# Patient Record
Sex: FEMALE | Race: WHITE | NOT HISPANIC OR LATINO | Employment: OTHER | URBAN - METROPOLITAN AREA
[De-identification: names, ages, dates, MRNs, and addresses within clinical notes are randomized per-mention and may not be internally consistent; named-entity substitution may affect disease eponyms.]

---

## 2017-01-11 DIAGNOSIS — M25.561 RIGHT KNEE PAIN: Primary | ICD-10-CM

## 2017-01-12 ENCOUNTER — OFFICE VISIT (OUTPATIENT)
Dept: ORTHOPEDICS | Facility: CLINIC | Age: 72
End: 2017-01-12

## 2017-01-12 VITALS — HEIGHT: 67 IN | WEIGHT: 153 LBS | BODY MASS INDEX: 24.01 KG/M2

## 2017-01-12 DIAGNOSIS — Z96.641 HISTORY OF TOTAL REPLACEMENT OF RIGHT HIP: Primary | ICD-10-CM

## 2017-01-12 DIAGNOSIS — M17.11 PRIMARY OSTEOARTHRITIS OF RIGHT KNEE: Primary | ICD-10-CM

## 2017-01-12 ASSESSMENT — HOOS S4: HOW SEVERE IS YOUR HIP JOINT STIFFNESS AFTER FIRST WAKENING IN THE MORNING?: MILD

## 2017-01-12 ASSESSMENT — ACTIVITIES OF DAILY LIVING (ADL)
ADL_SUBSCALE_SCORE: 100
ADL_SUM: 0
ADL_MEAN: 0

## 2017-01-12 NOTE — Clinical Note
1/12/2017       RE: Leah Peña  [address]    [facility] [place] [address]     Dear Colleague,    Thank you for referring your patient, Leah Peña, to the [facility] at [facility]. Please see a copy of my visit note below.    Chief Complaint: Surgical Followup and RECHECK  Right knee OA  Bilateral TAJ    HPI: Leah Peña returns today in follow-up for medial knee pain. She reports that this is most evident when walking for distance. She reports that she takes tylenol qhs and in the AM daily. She uses nordic walking sticks for walking all distances. She reports that she is doing on vacation soon and wonders if she should get a steroid injection. She reports that the hips are pain free and she is happy about this.     Medications and allergies are documented in the EMR and have been reviewed.      Current outpatient prescriptions:      amLODIPine (NORVASC) 2.5 MG tablet, Take 1 tablet (2.5 mg) by mouth daily, Disp: 90 tablet, Rfl: 3     losartan (COZAAR) 100 MG tablet, Take 1 tablet (100 mg) by mouth daily, Disp: 90 tablet, Rfl: 3     labetalol (NORMODYNE) 200 MG tablet, Take 1 tablet (200 mg) by mouth 2 times daily, Disp: 180 tablet, Rfl: 3     acetaminophen (TYLENOL) 325 MG tablet, Take 2 tablets (650 mg) by mouth every 6 hours as needed for mild pain, Disp: 100 tablet, Rfl: 0     GLUCOSAMINE-CHONDROITIN DS PO, Take by mouth daily, Disp: , Rfl:      VITAMIN E, , Disp: , Rfl:      amoxicillin (AMOXIL) 500 MG tablet, Take 4 tabs by mouth 1 hour prior to dental procedure., Disp: 4 tablet, Rfl: 4     CALCIUM 1500 MG OR TABS, 1 po daily, Disp: , Rfl:      MULTI-DAY VITAMINS OR TABS, 1 TABLET DAILY, Disp: , Rfl:      GARLIC 1000 MG OR CAPS, None Entered, Disp: , Rfl:     Allergies: Hctz and Vasotec    Physical Exam:  On physical examination the patient appears the stated age, is in no acute distress, affect is appropriate, and breathing is  "non-labored.    Ht 1.702 m (5' 7\")  Wt 69.4 kg (153 lb)  BMI 23.96 kg/m2  Body mass index is 23.96 kg/(m^2).    Gait: normal  ROM:  Not irritated.  The knee is benign in appearance without redness or swelling.   Flexion: 130 and extension is to 0  TTP at the medial joint.     Distally, the circulatory, motor, and sensation exam is intact with 5/5 EHL, gastroc-soleus, and tibialis anterior.  Sensation to light touch is intact.  Dorsalis pedis and posterior tibialis pulses are palpable.  There are no sores on the feet, no bruising, and no lymphedema.    X-rays:    I reviewed the x-rays dated today.  Previous films reviewed.    Findings:  Normal progression for a total hip arthroplasty without evidence of loosening or subsidence.  The right knee has early to moderate OA.     Assessment: right knee early to moderat OA    Plan: She is not sure if she wants to do a steroid injection and is going to consider this. If she decides to do this it can be booked as a short appointment and just come in for an injection.     Raul Hobson    Twenty minutes were spent with the patient with greater than 50% on counseling and coordination of care.         Again, thank you for allowing me to participate in the care of your patient.      Sincerely,    Nathaniel Epperson MD      "

## 2017-01-12 NOTE — PROGRESS NOTES
"Chief Complaint: Surgical Followup and RECHECK  Right knee OA  Bilateral TAJ    HPI: Laeh Peña returns today in follow-up for medial knee pain. She reports that this is most evident when walking for distance. She reports that she takes tylenol qhs and in the AM daily. She uses nordic walking sticks for walking all distances. She reports that she is doing on vacation soon and wonders if she should get a steroid injection. She reports that the hips are pain free and she is happy about this.     Medications and allergies are documented in the EMR and have been reviewed.      Current outpatient prescriptions:      amLODIPine (NORVASC) 2.5 MG tablet, Take 1 tablet (2.5 mg) by mouth daily, Disp: 90 tablet, Rfl: 3     losartan (COZAAR) 100 MG tablet, Take 1 tablet (100 mg) by mouth daily, Disp: 90 tablet, Rfl: 3     labetalol (NORMODYNE) 200 MG tablet, Take 1 tablet (200 mg) by mouth 2 times daily, Disp: 180 tablet, Rfl: 3     acetaminophen (TYLENOL) 325 MG tablet, Take 2 tablets (650 mg) by mouth every 6 hours as needed for mild pain, Disp: 100 tablet, Rfl: 0     GLUCOSAMINE-CHONDROITIN DS PO, Take by mouth daily, Disp: , Rfl:      VITAMIN E, , Disp: , Rfl:      amoxicillin (AMOXIL) 500 MG tablet, Take 4 tabs by mouth 1 hour prior to dental procedure., Disp: 4 tablet, Rfl: 4     CALCIUM 1500 MG OR TABS, 1 po daily, Disp: , Rfl:      MULTI-DAY VITAMINS OR TABS, 1 TABLET DAILY, Disp: , Rfl:      GARLIC 1000 MG OR CAPS, None Entered, Disp: , Rfl:     Allergies: Hctz and Vasotec    Physical Exam:  On physical examination the patient appears the stated age, is in no acute distress, affect is appropriate, and breathing is non-labored.    Ht 1.702 m (5' 7\")  Wt 69.4 kg (153 lb)  BMI 23.96 kg/m2  Body mass index is 23.96 kg/(m^2).    Gait: normal  ROM:  Not irritated.  The knee is benign in appearance without redness or swelling.   Flexion: 130 and extension is to 0  TTP at the medial joint.     Distally, the circulatory, " motor, and sensation exam is intact with 5/5 EHL, gastroc-soleus, and tibialis anterior.  Sensation to light touch is intact.  Dorsalis pedis and posterior tibialis pulses are palpable.  There are no sores on the feet, no bruising, and no lymphedema.    X-rays:    I reviewed the x-rays dated today.  Previous films reviewed.    Findings:  Normal progression for a total hip arthroplasty without evidence of loosening or subsidence.  The right knee has early to moderate OA.     Assessment: right knee early to moderat OA    Plan: She is not sure if she wants to do a steroid injection and is going to consider this. If she decides to do this it can be booked as a short appointment and just come in for an injection.     Raul Hobson    Twenty minutes were spent with the patient with greater than 50% on counseling and coordination of care.

## 2017-01-12 NOTE — NURSING NOTE
"Reason For Visit:   Chief Complaint   Patient presents with     Surgical Followup     s/p right TAJ DOS 10/27/2014     RECHECK     Pt. states that she is here today fo Right Knee Pain, she mention that when she is walking is the worst. She does hear clicking noise.        Pain Assessment  Patient Currently in Pain: No               HEIGHT: 5' 7\", WEIGHT: 153 lbs 0 oz, BMI: Body mass index is 23.96 kg/(m^2).      Current Outpatient Prescriptions   Medication Sig Dispense Refill     amLODIPine (NORVASC) 2.5 MG tablet Take 1 tablet (2.5 mg) by mouth daily 90 tablet 3     losartan (COZAAR) 100 MG tablet Take 1 tablet (100 mg) by mouth daily 90 tablet 3     labetalol (NORMODYNE) 200 MG tablet Take 1 tablet (200 mg) by mouth 2 times daily 180 tablet 3     acetaminophen (TYLENOL) 325 MG tablet Take 2 tablets (650 mg) by mouth every 6 hours as needed for mild pain 100 tablet 0     GLUCOSAMINE-CHONDROITIN DS PO Take by mouth daily       VITAMIN E        amoxicillin (AMOXIL) 500 MG tablet Take 4 tabs by mouth 1 hour prior to dental procedure. 4 tablet 4     CALCIUM 1500 MG OR TABS 1 po daily       MULTI-DAY VITAMINS OR TABS 1 TABLET DAILY       GARLIC 1000 MG OR CAPS None Entered            Allergies   Allergen Reactions     Hctz Dermatitis     Vasotec Cough       "

## 2017-01-29 ENCOUNTER — MYC MEDICAL ADVICE (OUTPATIENT)
Dept: FAMILY MEDICINE | Facility: CLINIC | Age: 72
End: 2017-01-29

## 2017-01-29 DIAGNOSIS — Z29.89 ALTITUDE SICKNESS PROPHYLAXIS: Primary | ICD-10-CM

## 2017-01-30 RX ORDER — DEXAMETHASONE 4 MG/1
4 TABLET ORAL 2 TIMES DAILY WITH MEALS
Qty: 6 TABLET | Refills: 0 | Status: SHIPPED
Start: 2017-01-30 | End: 2018-03-15

## 2017-01-30 NOTE — TELEPHONE ENCOUNTER
Dr. Hobson,  Please see [employer] message below.    Anitra Alvarez RN  [facility] [place]

## 2017-01-31 ENCOUNTER — TELEPHONE (OUTPATIENT)
Dept: FAMILY MEDICINE | Facility: CLINIC | Age: 72
End: 2017-01-31

## 2017-01-31 NOTE — TELEPHONE ENCOUNTER
Return call to Zayda, confirmed six tablets is correct quantity listed in order. No further action needed.       Aurora Garces RN  [facility]

## 2017-01-31 NOTE — TELEPHONE ENCOUNTER
Reason for call: Other   Patient called regarding (reason for call): prescription  Additional comments: Zayda from [facility] called with a question regarding a fax they received for dexamethasone (DECADRON) 4 MG tablet regarding the quantity prescribed. She stated that based on the directions she did not think it was a large enough quantity and wanted to be sure they were accurate.    Phone Number Pt can be reached at: Other phone number:  [phone]  Best Time: Any  Can we leave a detailed message on this number? Not Applicable

## 2017-02-08 ASSESSMENT — ACTIVITIES OF DAILY LIVING (ADL): FUNCTION,_DAILY_LIVING_SCORE: 86.76

## 2017-02-08 ASSESSMENT — KOOS JR: HOW SEVERE IS YOUR KNEE STIFFNESS AFTER FIRST WAKING IN MORNING: MILD

## 2017-02-09 ENCOUNTER — OFFICE VISIT (OUTPATIENT)
Dept: ORTHOPEDICS | Facility: CLINIC | Age: 72
End: 2017-02-09

## 2017-02-09 VITALS — BODY MASS INDEX: 23.73 KG/M2 | HEIGHT: 67 IN | WEIGHT: 151.2 LBS

## 2017-02-09 DIAGNOSIS — M17.11 PRIMARY OSTEOARTHRITIS OF RIGHT KNEE: Primary | ICD-10-CM

## 2017-02-09 NOTE — Clinical Note
2/9/2017       RE: Leah Peña  [address]    [facility] [address]     Dear Colleague,    Thank you for referring your patient, Leah Peña, to the [facility] at [facility]. Please see a copy of my visit note below.    Leah presents for a right knee steroid injection. She has known OA. It is continuing to interfer with her daily activities. On physical examination the knee is benign in appearance and without redness, swelling, or induration.     Procedure Note:    After informed consent was obtained the patient's right knee was injected with 5 cc of lidocaine and 40 mg of kenolog using a superior-medial approach.  Sterile technique was used.  The patient tolerated the procedure well.        Again, thank you for allowing me to participate in the care of your patient.      Sincerely,    Nathaniel Epperson MD

## 2017-02-09 NOTE — NURSING NOTE
[facility]  [address] [place] [id number]  Dept: [phone]  ______________________________________________________________________________    Patient: Leah Peña   : 1945   MRN: [id number]   2017    INVASIVE PROCEDURE SAFETY CHECKLIST    Date: 2017   Procedure: Right Knee Cortisone injection  Patient Name: Leah Peña  MRN: [id number]  YOB: 1945    Action: Complete sections as appropriate. Any discrepancy results in a HARD COPY until resolved.     PRE PROCEDURE:  Patient ID verified with 2 identifiers (name and  or MRN): Yes  Procedure and site verified with patient/designee (when able): Yes  Accurate consent documentation in medical record: Yes  H&P (or appropriate assessment) documented in medical record: Yes  H&P must be up to 20 days prior to procedure and updates within 24 hours of procedure as applicable: Yes  Relevant diagnostic and radiology test results appropriately labeled and displayed as applicable: Yes  Procedure site(s) marked with provider initials: Yes    TIMEOUT:  Time-Out performed immediately prior to starting procedure, including verbal and active participation of all team members addressing the following:Yes  * Correct patient identify  * Confirmed that the correct side and site are marked  * An accurate procedure consent form  * Agreement on the procedure to be done  * Correct patient position  * Relevant images and results are properly labeled and appropriately displayed  * The need to administer antibiotics or fluids for irrigation purposes during the procedure as applicable   * Safety precautions based on patient history or medication use    DURING PROCEDURE: Verification of correct person, site, and procedures any time the responsibility for care of the patient is transferred to another member of the care team.

## 2017-02-09 NOTE — NURSING NOTE
"Reason For Visit:   Chief Complaint   Patient presents with     RECHECK     Pt. states that she is here today for Right knee Cortisone Injection.        Pain Assessment  Patient Currently in Pain: No               HEIGHT: 5' 7\", WEIGHT: 151 lbs 3.2 oz, BMI: Body mass index is 23.68 kg/(m^2).      Current Outpatient Prescriptions   Medication Sig Dispense Refill     dexamethasone (DECADRON) 4 MG tablet Take 1 tablet (4 mg) by mouth 2 times daily (with meals) 6 tablet 0     amLODIPine (NORVASC) 2.5 MG tablet Take 1 tablet (2.5 mg) by mouth daily 90 tablet 3     losartan (COZAAR) 100 MG tablet Take 1 tablet (100 mg) by mouth daily 90 tablet 3     labetalol (NORMODYNE) 200 MG tablet Take 1 tablet (200 mg) by mouth 2 times daily 180 tablet 3     acetaminophen (TYLENOL) 325 MG tablet Take 2 tablets (650 mg) by mouth every 6 hours as needed for mild pain 100 tablet 0     GLUCOSAMINE-CHONDROITIN DS PO Take by mouth daily       VITAMIN E        amoxicillin (AMOXIL) 500 MG tablet Take 4 tabs by mouth 1 hour prior to dental procedure. 4 tablet 4     CALCIUM 1500 MG OR TABS 1 po daily       MULTI-DAY VITAMINS OR TABS 1 TABLET DAILY       GARLIC 1000 MG OR CAPS None Entered            Allergies   Allergen Reactions     Hctz Dermatitis     Vasotec Cough       "

## 2017-02-10 NOTE — PROGRESS NOTES
Leah presents for a right knee steroid injection. She has known OA. It is continuing to interfer with her daily activities. On physical examination the knee is benign in appearance and without redness, swelling, or induration.     Procedure Note:    After informed consent was obtained the patient's right knee was injected with 5 cc of lidocaine and 40 mg of kenolog using a superior-medial approach.  Sterile technique was used.  The patient tolerated the procedure well.    Answers for HPI/ROS submitted by the patient on 1/27/2017   General Symptoms: No  Skin Symptoms: No  HENT Symptoms: No  EYE SYMPTOMS: No  HEART SYMPTOMS: No  LUNG SYMPTOMS: No  INTESTINAL SYMPTOMS: No  URINARY SYMPTOMS: No  GYNECOLOGIC SYMPTOMS: No  BREAST SYMPTOMS: No  SKELETAL SYMPTOMS: No  BLOOD SYMPTOMS: No  NERVOUS SYSTEM SYMPTOMS: No  MENTAL HEALTH SYMPTOMS: No

## 2017-04-18 ENCOUNTER — OFFICE VISIT (OUTPATIENT)
Dept: CARDIOLOGY | Facility: CLINIC | Age: 72
End: 2017-04-18
Attending: INTERNAL MEDICINE
Payer: COMMERCIAL

## 2017-04-18 VITALS
HEIGHT: 67 IN | BODY MASS INDEX: 25.25 KG/M2 | DIASTOLIC BLOOD PRESSURE: 90 MMHG | WEIGHT: 160.9 LBS | OXYGEN SATURATION: 99 % | SYSTOLIC BLOOD PRESSURE: 152 MMHG | HEART RATE: 72 BPM

## 2017-04-18 DIAGNOSIS — I10 BENIGN ESSENTIAL HYPERTENSION: Primary | ICD-10-CM

## 2017-04-18 DIAGNOSIS — I10 HYPERTENSION GOAL BP (BLOOD PRESSURE) < 140/90: ICD-10-CM

## 2017-04-18 PROCEDURE — 99213 OFFICE O/P EST LOW 20 MIN: CPT | Mod: ZF

## 2017-04-18 PROCEDURE — 99214 OFFICE O/P EST MOD 30 MIN: CPT | Mod: ZP | Performed by: INTERNAL MEDICINE

## 2017-04-18 RX ORDER — AMLODIPINE BESYLATE 5 MG/1
5 TABLET ORAL DAILY
Qty: 90 TABLET | Refills: 3 | Status: SHIPPED | OUTPATIENT
Start: 2017-04-18 | End: 2018-06-06

## 2017-04-18 RX ORDER — LOSARTAN POTASSIUM 100 MG/1
100 TABLET ORAL DAILY
Qty: 90 TABLET | Refills: 3 | Status: SHIPPED | OUTPATIENT
Start: 2017-04-18 | End: 2018-04-17

## 2017-04-18 RX ORDER — LABETALOL 200 MG/1
200 TABLET, FILM COATED ORAL 2 TIMES DAILY
Qty: 180 TABLET | Refills: 3 | Status: SHIPPED | OUTPATIENT
Start: 2017-04-18 | End: 2018-04-17

## 2017-04-18 ASSESSMENT — PAIN SCALES - GENERAL: PAINLEVEL: NO PAIN (0)

## 2017-04-18 NOTE — LETTER
4/18/2017      RE: Leah Peña  [address]    [facility] [address]       Dear Colleague,    Thank you for the opportunity to participate in the care of your patient, Leah Peña, at the [facility] at [facility]. Please see a copy of my visit note below.       SUBJECTIVE:  Leah Peña is a 71 year old female who presents for evaluation of HTN. No complaints. Returned from a 30 day cruise.    Patient Active Problem List    Diagnosis Date Noted     Lumbar radicular pain 05/19/2016     Priority: Medium     Urinary tract infection, site unspecified 04/07/2016     Priority: Medium     Osteoarthritis of hip 10/27/2014     Priority: Medium     Do you wish to do the replacement in the background? yes         Osteoarthritis of right hip 10/20/2014     Priority: Medium     Colon polyp 10/20/2014     Priority: Medium     Do you wish to do the replacement in the background? yes         Seasonal affective disorder (H) 10/20/2014     Priority: Medium     CARDIOVASCULAR SCREENING; LDL GOAL LESS THAN 130 10/20/2014     Priority: Medium     Advanced directives, counseling/discussion 08/02/2013     Priority: Medium     Advance Care Planning:   Receipt of ACP document:  Received: Health Care Directive which was witnessed or notarized on 6/22/2004.  Document not previously scanned.  Validation form completed and sent with document to be scanned.  Code Status reflects choices in most recent ACP document.  Confirmed/documented designated decision maker(s). See permanent comments section of demographics in clinical tab. View document(s) and details by clicking on code status.   Added by Gabi Rodriguez on 8/2/2013.             Hypertension goal BP (blood pressure) < 140/90      Priority: Medium    .  Current Outpatient Prescriptions   Medication Sig     amLODIPine (NORVASC) 2.5 MG tablet Take 1 tablet (2.5 mg) by mouth daily     losartan (COZAAR) 100 MG tablet Take 1  tablet (100 mg) by mouth daily     acetaminophen (TYLENOL) 325 MG tablet Take 2 tablets (650 mg) by mouth every 6 hours as needed for mild pain     GLUCOSAMINE-CHONDROITIN DS PO Take by mouth daily     VITAMIN E      amoxicillin (AMOXIL) 500 MG tablet Take 4 tabs by mouth 1 hour prior to dental procedure.     CALCIUM 1500 MG OR TABS 1 po daily     MULTI-DAY VITAMINS OR TABS 1 TABLET DAILY     GARLIC 1000 MG OR CAPS None Entered     dexamethasone (DECADRON) 4 MG tablet Take 1 tablet (4 mg) by mouth 2 times daily (with meals) (Patient not taking: Reported on 4/18/2017)     labetalol (NORMODYNE) 200 MG tablet Take 1 tablet (200 mg) by mouth 2 times daily     No current facility-administered medications for this visit.      Past Medical History:   Diagnosis Date     Hypertension goal BP (blood pressure) < 140/90      Perforated ulcer (H) 06/91     Primary osteoarthritis of left hip      Past Surgical History:   Procedure Laterality Date     ARTHROPLASTY HIP  4/25/2012    Procedure:ARTHROPLASTY HIP; Left Total Hip Arthroplasty; Surgeon:NATHANIEL CASTRO; Location: OR     ARTHROPLASTY HIP Right 10/27/2014    Procedure: ARTHROPLASTY HIP;  Surgeon: Nathaniel Castro MD;  Location:  OR      PELVIS/HIP JOINT SURGERY UNLISTED  2012 & 2014     C STOMACH SURGERY PROCEDURE UNLISTED  1991    Perferated ulcer     COLONOSCOPY  02/12    repeat 5 yrs     EXTRACAPSULAR CATARACT EXTRATION WITH INTRAOCULAR LENS IMPLANT  12/23/09     OPEN REDUCTION INTERNAL FIXATION ANKLE  02/05    right     OPEN REDUCTION INTERNAL FIXATION WRIST  03/01    left     VAGOTOMY, PYLOROPLASTY, COMBINED  09/91     Allergies   Allergen Reactions     Hctz Dermatitis     Vasotec Cough     Social History     Social History     Marital status:      Spouse name: N/A     Number of children: N/A     Years of education: N/A     Occupational History     Not on file.     Social History Main Topics     Smoking status: Former Smoker     Packs/day: 1.00      "Years: 10.00     Types: Cigarettes     Quit date: 11/14/1983     Smokeless tobacco: Never Used      Comment: quit 30+ years ago     Alcohol use Yes      Comment: social     Drug use: No     Sexual activity: No     Other Topics Concern     Parent/Sibling W/ Cabg, Mi Or Angioplasty Before 65f 55m? Yes     Social History Narrative     Family History   Problem Relation Age of Onset     HEART DISEASE Brother      Coronary Artery Disease Brother      possibly agent orange related     Hypertension Mother      1954     Prostate Cancer Brother      CEREBROVASCULAR DISEASE Mother      1956, 1954, 1954, 1954, 1954, 1954, 1954, 1954          REVIEW OF SYSTEMS:  General: negative, fever, chills, night sweats  Skin: negative, acne, rash and scaling  Eyes: negative, double vision, eye pain and photophobia  Ears/Nose/Throat: negative, nasal congestion and purulent rhinorrhea  Respiratory: No dyspnea on exertion, No cough, No hemoptysis and negative  Cardiovascular: negative, palpitations, tachycardia, irregular heart beat, chest pain, exertional chest pain or pressure, paroxysmal nocturnal dyspnea, dyspnea on exertion and orthopnea       OBJECTIVE:  Blood pressure 152/90, pulse 72, height 1.702 m (5' 7\"), weight 73 kg (160 lb 14.4 oz), SpO2 99 %.  General Appearance: healthy, alert, active and no distress  Head: Normocephalic. No masses, lesions, tenderness or abnormalities  Eyes: conjuctiva clear, PERRL, EOM intact  Ears: External ears normal. Canals clear. TM's normal.  Nose: Nares normal  Mouth: normal  Neck: Supple, no cervical adenopathy, no thyromegaly  Lungs: clear to auscultation  Cardiac: regular rate and rhythm, normal S1 and S2, no murmur         ASSESSMENT/PLAN:  Patient with HTN. Reluctant to take meds.  No complaints.  Today's /90.  Will increase Amlodipine to 5mg daily.  Per orders.   Return to Clinic PRN.  Answers for HPI/ROS submitted by the patient on 4/13/2017   General Symptoms: No  Skin Symptoms: No  HENT " Symptoms: No  EYE SYMPTOMS: No  HEART SYMPTOMS: No  LUNG SYMPTOMS: No  INTESTINAL SYMPTOMS: No  URINARY SYMPTOMS: No  GYNECOLOGIC SYMPTOMS: No  BREAST SYMPTOMS: No  SKELETAL SYMPTOMS: No  BLOOD SYMPTOMS: No  NERVOUS SYSTEM SYMPTOMS: No  MENTAL HEALTH SYMPTOMS: No      Please do not hesitate to contact me if you have any questions/concerns.     Sincerely,     IRINA Mendoza MD

## 2017-04-18 NOTE — PROGRESS NOTES
SUBJECTIVE:  Leah Peña is a 71 year old female who presents for evaluation of HTN. No complaints. Returned from a 30 day cruise.    Patient Active Problem List    Diagnosis Date Noted     Lumbar radicular pain 05/19/2016     Priority: Medium     Urinary tract infection, site unspecified 04/07/2016     Priority: Medium     Osteoarthritis of hip 10/27/2014     Priority: Medium     Do you wish to do the replacement in the background? yes         Osteoarthritis of right hip 10/20/2014     Priority: Medium     Colon polyp 10/20/2014     Priority: Medium     Do you wish to do the replacement in the background? yes         Seasonal affective disorder (H) 10/20/2014     Priority: Medium     CARDIOVASCULAR SCREENING; LDL GOAL LESS THAN 130 10/20/2014     Priority: Medium     Advanced directives, counseling/discussion 08/02/2013     Priority: Medium     Advance Care Planning:   Receipt of ACP document:  Received: Health Care Directive which was witnessed or notarized on 6/22/2004.  Document not previously scanned.  Validation form completed and sent with document to be scanned.  Code Status reflects choices in most recent ACP document.  Confirmed/documented designated decision maker(s). See permanent comments section of demographics in clinical tab. View document(s) and details by clicking on code status.   Added by Gabi Rodriguez on 8/2/2013.             Hypertension goal BP (blood pressure) < 140/90      Priority: Medium    .  Current Outpatient Prescriptions   Medication Sig     amLODIPine (NORVASC) 2.5 MG tablet Take 1 tablet (2.5 mg) by mouth daily     losartan (COZAAR) 100 MG tablet Take 1 tablet (100 mg) by mouth daily     acetaminophen (TYLENOL) 325 MG tablet Take 2 tablets (650 mg) by mouth every 6 hours as needed for mild pain     GLUCOSAMINE-CHONDROITIN DS PO Take by mouth daily     VITAMIN E      amoxicillin (AMOXIL) 500 MG tablet Take 4 tabs by mouth 1 hour prior to dental procedure.     CALCIUM 1500 MG OR  TABS 1 po daily     MULTI-DAY VITAMINS OR TABS 1 TABLET DAILY     GARLIC 1000 MG OR CAPS None Entered     dexamethasone (DECADRON) 4 MG tablet Take 1 tablet (4 mg) by mouth 2 times daily (with meals) (Patient not taking: Reported on 4/18/2017)     labetalol (NORMODYNE) 200 MG tablet Take 1 tablet (200 mg) by mouth 2 times daily     No current facility-administered medications for this visit.      Past Medical History:   Diagnosis Date     Hypertension goal BP (blood pressure) < 140/90      Perforated ulcer (H) 06/91     Primary osteoarthritis of left hip      Past Surgical History:   Procedure Laterality Date     ARTHROPLASTY HIP  4/25/2012    Procedure:ARTHROPLASTY HIP; Left Total Hip Arthroplasty; Surgeon:NATHANIEL CASTRO; Location:[facility]     ARTHROPLASTY HIP Right 10/27/2014    Procedure: ARTHROPLASTY HIP;  Surgeon: Nathaniel Castro MD;  Location: [facility]     C PELVIS/HIP JOINT SURGERY UNLISTED  2012 & 2014     C STOMACH SURGERY PROCEDURE UNLISTED  1991    Perferated ulcer     COLONOSCOPY  02/12    repeat 5 yrs     EXTRACAPSULAR CATARACT EXTRATION WITH INTRAOCULAR LENS IMPLANT  12/23/09     OPEN REDUCTION INTERNAL FIXATION ANKLE  02/05    right     OPEN REDUCTION INTERNAL FIXATION WRIST  03/01    left     VAGOTOMY, PYLOROPLASTY, COMBINED  09/91     Allergies   Allergen Reactions     Hctz Dermatitis     Vasotec Cough     Social History     Social History     Marital status:      Spouse name: N/A     Number of children: N/A     Years of education: N/A     Occupational History     Not on file.     Social History Main Topics     Smoking status: Former Smoker     Packs/day: 1.00     Years: 10.00     Types: Cigarettes     Quit date: 11/14/1983     Smokeless tobacco: Never Used      Comment: quit 30+ years ago     Alcohol use Yes      Comment: social     Drug use: No     Sexual activity: No     Other Topics Concern     Parent/Sibling W/ Cabg, Mi Or Angioplasty Before 65f 55m? Yes     Social History Narrative  "    Family History   Problem Relation Age of Onset     HEART DISEASE Brother      Coronary Artery Disease Brother      possibly agent orange related     Hypertension Mother      1954     Prostate Cancer Brother      CEREBROVASCULAR DISEASE Mother      1956, 1954, 1954, 1954, 1954, 1954, 1954, 1954          REVIEW OF SYSTEMS:  General: negative, fever, chills, night sweats  Skin: negative, acne, rash and scaling  Eyes: negative, double vision, eye pain and photophobia  Ears/Nose/Throat: negative, nasal congestion and purulent rhinorrhea  Respiratory: No dyspnea on exertion, No cough, No hemoptysis and negative  Cardiovascular: negative, palpitations, tachycardia, irregular heart beat, chest pain, exertional chest pain or pressure, paroxysmal nocturnal dyspnea, dyspnea on exertion and orthopnea       OBJECTIVE:  Blood pressure 152/90, pulse 72, height 1.702 m (5' 7\"), weight 73 kg (160 lb 14.4 oz), SpO2 99 %.  General Appearance: healthy, alert, active and no distress  Head: Normocephalic. No masses, lesions, tenderness or abnormalities  Eyes: conjuctiva clear, PERRL, EOM intact  Ears: External ears normal. Canals clear. TM's normal.  Nose: Nares normal  Mouth: normal  Neck: Supple, no cervical adenopathy, no thyromegaly  Lungs: clear to auscultation  Cardiac: regular rate and rhythm, normal S1 and S2, no murmur         ASSESSMENT/PLAN:  Patient with HTN. Reluctant to take meds.  No complaints.  Today's /90.  Will increase Amlodipine to 5mg daily.  Per orders.   Return to Clinic PRN.  Answers for HPI/ROS submitted by the patient on 4/13/2017   General Symptoms: No  Skin Symptoms: No  HENT Symptoms: No  EYE SYMPTOMS: No  HEART SYMPTOMS: No  LUNG SYMPTOMS: No  INTESTINAL SYMPTOMS: No  URINARY SYMPTOMS: No  GYNECOLOGIC SYMPTOMS: No  BREAST SYMPTOMS: No  SKELETAL SYMPTOMS: No  BLOOD SYMPTOMS: No  NERVOUS SYSTEM SYMPTOMS: No  MENTAL HEALTH SYMPTOMS: No    "

## 2017-04-18 NOTE — NURSING NOTE
Med Reconcile: Reviewed and verified all current medications with the patient. The updated medication list was printed and given to the patient.  Return Appointment: PRN. Patient given instructions regarding scheduling next clinic visit. Patient demonstrated understanding of this information and agreed to call with further questions or concerns.  Medication Change: Increase Amlodipine to 5 MG once daily,.Patient was educated regarding prescribed medication change, including discussion of the indication, administration, side effects, and when to report to MD or RN. Patient demonstrated understanding of this information and agreed to call with further questions or concerns.  Patient stated she understood all health information given and agreed to call with further questions or concerns.

## 2017-04-18 NOTE — PATIENT INSTRUCTIONS
Please increase your amlodipine to 5 MG once daily.    Thank you for your visit today.  Please call me with any questions or concerns.   Mingo Otero RN  Cardiology Care Coordinator  [phone], press option 1 then option 3

## 2017-04-18 NOTE — MR AVS SNAPSHOT
After Visit Summary   4/18/2017    Leah Peña    MRN: [id number]           Patient Information     Date Of Birth          1945        Visit Information        Provider Department      4/18/2017 1:30 PM IRINA Mendoza MD [facility]        Today's Diagnoses     Benign essential hypertension    -  1      Care Instructions    Please increase your amlodipine to 5 MG once daily.    Thank you for your visit today.  Please call me with any questions or concerns.   Mingo Otero RN  Cardiology Care Coordinator  [phone], press option 1 then option 3        Follow-ups after your visit        Follow-up notes from your care team     Return if symptoms worsen or fail to improve.      Who to contact     If you have questions or need follow up information about today's clinic visit or your schedule please contact [facility] directly at [phone].  Normal or non-critical lab and imaging results will be communicated to you by [employer], letter or phone within 4 business days after the clinic has received the results. If you do not hear from us within 7 days, please contact the clinic through [employer] or phone. If you have a critical or abnormal lab result, we will notify you by phone as soon as possible.  Submit refill requests through [employer] or call your pharmacy and they will forward the refill request to us. Please allow 3 business days for your refill to be completed.          Additional Information About Your Visit        MyChart Information     [employer] gives you secure access to your electronic health record. If you see a primary care provider, you can also send messages to your care team and make appointments. If you have questions, please call your primary care clinic.  If you do not have a primary care provider, please call [phone] and they will assist you.        Care EveryWhere ID     This is your Care EveryWhere ID. This could be used by other organizations to  "access your [place] medical records  [id number]        Your Vitals Were     Pulse Height Pulse Oximetry BMI (Body Mass Index)          72 1.702 m (5' 7\") 99% 25.2 kg/m2         Blood Pressure from Last 3 Encounters:   04/18/17 152/90   09/20/16 147/88   06/01/16 (!) 174/107    Weight from Last 3 Encounters:   04/18/17 73 kg (160 lb 14.4 oz)   02/09/17 68.6 kg (151 lb 3.2 oz)   01/12/17 69.4 kg (153 lb)              Today, you had the following     No orders found for display         Today's Medication Changes          These changes are accurate as of: 4/18/17  1:49 PM.  If you have any questions, ask your nurse or doctor.               These medicines have changed or have updated prescriptions.        Dose/Directions    amLODIPine 5 MG tablet   Commonly known as:  NORVASC   This may have changed:    - medication strength  - how much to take   Used for:  Benign essential hypertension   Changed by:  IRINA Mendoza MD        Dose:  5 mg   Take 1 tablet (5 mg) by mouth daily   Quantity:  90 tablet   Refills:  3            Where to get your medicines      These medications were sent to [facility] [id number] [place], [place] - [address]  [address], [place] [address]     Phone:  [phone]     amLODIPine 5 MG tablet                Primary Care Provider Office Phone # Fax #    Raul Hobson [phone]       [facility] [address] [address]  [facility] [address]        Thank you!     Thank you for choosing [facility]  for your care. Our goal is always to provide you with excellent care. Hearing back from our patients is one way we can continue to improve our services. Please take a few minutes to complete the written survey that you may receive in the mail after your visit with us. Thank you!             Your Updated Medication List - Protect others around you: Learn how to safely use, store and throw away your medicines at www.disposemymeds.org. "          This list is accurate as of: 4/18/17  1:49 PM.  Always use your most recent med list.                   Brand Name Dispense Instructions for use    acetaminophen 325 MG tablet    TYLENOL    100 tablet    Take 2 tablets (650 mg) by mouth every 6 hours as needed for mild pain       amLODIPine 5 MG tablet    NORVASC    90 tablet    Take 1 tablet (5 mg) by mouth daily       amoxicillin 500 MG tablet    AMOXIL    4 tablet    Take 4 tabs by mouth 1 hour prior to dental procedure.       calcium 1500 MG Tabs      1 po daily       dexamethasone 4 MG tablet    DECADRON    6 tablet    Take 1 tablet (4 mg) by mouth 2 times daily (with meals)       Garlic 1000 MG Caps      None Entered       GLUCOSAMINE-CHONDROITIN DS PO      Take by mouth daily       labetalol 200 MG tablet    NORMODYNE    180 tablet    Take 1 tablet (200 mg) by mouth 2 times daily       losartan 100 MG tablet    COZAAR    90 tablet    Take 1 tablet (100 mg) by mouth daily       MULTI-DAY vitamin  S Tabs      1 TABLET DAILY       VITAMIN E

## 2017-10-09 ENCOUNTER — OFFICE VISIT (OUTPATIENT)
Dept: FAMILY MEDICINE | Facility: CLINIC | Age: 72
End: 2017-10-09
Payer: COMMERCIAL

## 2017-10-09 VITALS
SYSTOLIC BLOOD PRESSURE: 143 MMHG | RESPIRATION RATE: 14 BRPM | OXYGEN SATURATION: 97 % | WEIGHT: 159.8 LBS | DIASTOLIC BLOOD PRESSURE: 96 MMHG | HEIGHT: 67 IN | HEART RATE: 85 BPM | BODY MASS INDEX: 25.08 KG/M2 | TEMPERATURE: 98.2 F

## 2017-10-09 DIAGNOSIS — D48.5 NEOPLASM OF UNCERTAIN BEHAVIOR OF SKIN: ICD-10-CM

## 2017-10-09 DIAGNOSIS — F33.8 SEASONAL AFFECTIVE DISORDER (H): ICD-10-CM

## 2017-10-09 DIAGNOSIS — I10 ELEVATED BLOOD PRESSURE READING IN OFFICE WITH WHITE COAT SYNDROME, WITH DIAGNOSIS OF HYPERTENSION: ICD-10-CM

## 2017-10-09 DIAGNOSIS — Z00.00 ENCOUNTER FOR ROUTINE ADULT HEALTH EXAMINATION WITHOUT ABNORMAL FINDINGS: Primary | ICD-10-CM

## 2017-10-09 DIAGNOSIS — Z23 NEED FOR PROPHYLACTIC VACCINATION AND INOCULATION AGAINST INFLUENZA: ICD-10-CM

## 2017-10-09 LAB
ALBUMIN SERPL-MCNC: 4 G/DL (ref 3.4–5)
ALP SERPL-CCNC: 94 U/L (ref 40–150)
ALT SERPL W P-5'-P-CCNC: 24 U/L (ref 0–50)
ANION GAP SERPL CALCULATED.3IONS-SCNC: 10 MMOL/L (ref 3–14)
AST SERPL W P-5'-P-CCNC: 20 U/L (ref 0–45)
BILIRUB SERPL-MCNC: 0.6 MG/DL (ref 0.2–1.3)
BUN SERPL-MCNC: 11 MG/DL (ref 7–30)
CALCIUM SERPL-MCNC: 9.1 MG/DL (ref 8.5–10.1)
CHLORIDE SERPL-SCNC: 103 MMOL/L (ref 94–109)
CHOLEST SERPL-MCNC: 237 MG/DL
CO2 SERPL-SCNC: 22 MMOL/L (ref 20–32)
CREAT SERPL-MCNC: 0.7 MG/DL (ref 0.52–1.04)
GFR SERPL CREATININE-BSD FRML MDRD: 82 ML/MIN/1.7M2
GLUCOSE SERPL-MCNC: 96 MG/DL (ref 70–99)
HDLC SERPL-MCNC: 100 MG/DL
LDLC SERPL CALC-MCNC: 124 MG/DL
NONHDLC SERPL-MCNC: 137 MG/DL
POTASSIUM SERPL-SCNC: 4.1 MMOL/L (ref 3.4–5.3)
PROT SERPL-MCNC: 7 G/DL (ref 6.8–8.8)
SODIUM SERPL-SCNC: 135 MMOL/L (ref 133–144)
TRIGL SERPL-MCNC: 66 MG/DL

## 2017-10-09 PROCEDURE — 99397 PER PM REEVAL EST PAT 65+ YR: CPT | Performed by: FAMILY MEDICINE

## 2017-10-09 PROCEDURE — 36415 COLL VENOUS BLD VENIPUNCTURE: CPT | Performed by: FAMILY MEDICINE

## 2017-10-09 PROCEDURE — 80061 LIPID PANEL: CPT | Performed by: FAMILY MEDICINE

## 2017-10-09 PROCEDURE — 80053 COMPREHEN METABOLIC PANEL: CPT | Performed by: FAMILY MEDICINE

## 2017-10-09 NOTE — NURSING NOTE
"Chief Complaint   Patient presents with     Physical       Initial BP (!) 143/96 (BP Location: Left arm)  Pulse 85  Temp 98.2  F (36.8  C) (Oral)  Resp 14  Ht 5' 7\" (1.702 m)  Wt 159 lb 12.8 oz (72.5 kg)  SpO2 97%  BMI 25.03 kg/m2 Estimated body mass index is 25.03 kg/(m^2) as calculated from the following:    Height as of this encounter: 5' 7\" (1.702 m).    Weight as of this encounter: 159 lb 12.8 oz (72.5 kg).  Medication Reconciliation: complete     Marah Gray CMA      "

## 2017-10-09 NOTE — MR AVS SNAPSHOT
After Visit Summary   10/9/2017    Leah Peña    MRN: [id number]           Patient Information     Date Of Birth          1945        Visit Information        Provider Department      10/9/2017 10:30 AM Raul Hobson MD [facility]        Today's Diagnoses     Encounter for routine adult health examination without abnormal findings    -  1    Elevated blood pressure reading in office with white coat syndrome, with diagnosis of hypertension        Seasonal affective disorder (H)        Need for prophylactic vaccination and inoculation against influenza        Neoplasm of uncertain behavior of skin          Care Instructions    -Please follow up with dermatology to discuss the spots on your forearm and to have a skin check.    Preventive Health Recommendations  Female Ages 65 +    Yearly exam:     See your health care provider every year in order to  o Review health changes.   o Discuss preventive care.    o Review your medicines if your doctor has prescribed any.      You no longer need a yearly Pap test unless you've had an abnormal Pap test in the past 10 years. If you have vaginal symptoms, such as bleeding or discharge, be sure to talk with your provider about a Pap test.      Every 1 to 2 years, have a mammogram.  If you are over 69, talk with your health care provider about whether or not you want to continue having screening mammograms.      Every 10 years, have a colonoscopy. Or, have a yearly FIT test (stool test). These exams will check for colon cancer.       Have a cholesterol test every 5 years, or more often if your doctor advises it.       Have a diabetes test (fasting glucose) every three years. If you are at risk for diabetes, you should have this test more often.       At age 65, have a bone density scan (DEXA) to check for osteoporosis (brittle bone disease).    Shots:    Get a flu shot each year.    Get a tetanus shot every 10 years.    Talk to your  doctor about your pneumonia vaccines. There are now two you should receive - Pneumovax (PPSV 23) and Prevnar (PCV 13).    Talk to your doctor about the shingles vaccine.    Talk to your doctor about the hepatitis B vaccine.    Nutrition:     Eat at least 5 servings of fruits and vegetables each day.      Eat whole-grain bread, whole-wheat pasta and brown rice instead of white grains and rice.      Talk to your provider about Calcium and Vitamin D.     Lifestyle    Exercise at least 150 minutes a week (30 minutes a day, 5 days a week). This will help you control your weight and prevent disease.      Limit alcohol to one drink per day.      No smoking.       Wear sunscreen to prevent skin cancer.       See your dentist twice a year for an exam and cleaning.      See your eye doctor every 1 to 2 years to screen for conditions such as glaucoma, macular degeneration, cataracts, etc           Follow-ups after your visit        Additional Services     DERMATOLOGY REFERRAL       Your provider has referred you to: [facility]: [facility] [phone]   [email or web address]    Please be aware that coverage of these services is subject to the terms and limitations of your health insurance plan.  Call member services at your health plan with any benefit or coverage questions.      Please bring the following with you to your appointment:    (1) Any X-Rays, CTs or MRIs which have been performed.  Contact the facility where they were done to arrange for  prior to your scheduled appointment.    (2) List of current medications  (3) This referral request   (4) Any documents/labs given to you for this referral                  Who to contact     If you have questions or need follow up information about today's clinic visit or your schedule please contact [facility] directly at [phone].  Normal or non-critical lab and imaging results will be communicated to you by  "MyChart, letter or phone within 4 business days after the clinic has received the results. If you do not hear from us within 7 days, please contact the clinic through [employer] or phone. If you have a critical or abnormal lab result, we will notify you by phone as soon as possible.  Submit refill requests through [employer] or call your pharmacy and they will forward the refill request to us. Please allow 3 business days for your refill to be completed.          Additional Information About Your Visit        [employer] Information     [employer] gives you secure access to your electronic health record. If you see a primary care provider, you can also send messages to your care team and make appointments. If you have questions, please call your primary care clinic.  If you do not have a primary care provider, please call [phone] and they will assist you.        Care EveryWhere ID     This is your Care EveryWhere ID. This could be used by other organizations to access your [place] medical records  [id number]        Your Vitals Were     Pulse Temperature Respirations Height Pulse Oximetry BMI (Body Mass Index)    85 98.2  F (36.8  C) (Oral) 14 5' 7\" (1.702 m) 97% 25.03 kg/m2       Blood Pressure from Last 3 Encounters:   10/09/17 (!) 143/96   04/18/17 152/90   09/20/16 147/88    Weight from Last 3 Encounters:   10/09/17 159 lb 12.8 oz (72.5 kg)   04/18/17 160 lb 14.4 oz (73 kg)   02/09/17 151 lb 3.2 oz (68.6 kg)              We Performed the Following     Comprehensive metabolic panel     DERMATOLOGY REFERRAL     LIPID REFLEX TO DIRECT LDL PANEL        Primary Care Provider Office Phone # Fax #    Raul Hobson [phone]       [address] [facility] [address]  [facility] [address]        Equal Access to Services     TERESA MELENDEZ : Hadii panfilo Castro, waarmidada julieta, qaybta kaalmakhang awan, nano wheat. So [facility] [phone].    ATENCIÓN: Si meghann byrne, " tiene a ruiz disposición servicios gratuitos de asistencia lingüística. Miguel murillo [phone].    We comply with applicable federal civil rights laws and [place] laws. We do not discriminate on the basis of race, color, national origin, age, disability, sex, sexual orientation, or gender identity.            Thank you!     Thank you for choosing [facility]  for your care. Our goal is always to provide you with excellent care. Hearing back from our patients is one way we can continue to improve our services. Please take a few minutes to complete the written survey that you may receive in the mail after your visit with us. Thank you!             Your Updated Medication List - Protect others around you: Learn how to safely use, store and throw away your medicines at www.disposemymeds.org.          This list is accurate as of: 10/9/17  4:42 PM.  Always use your most recent med list.                   Brand Name Dispense Instructions for use Diagnosis    acetaminophen 325 MG tablet    TYLENOL    100 tablet    Take 2 tablets (650 mg) by mouth every 6 hours as needed for mild pain    Osteoarthritis of right hip       amLODIPine 5 MG tablet    NORVASC    90 tablet    Take 1 tablet (5 mg) by mouth daily    Benign essential hypertension       amoxicillin 500 MG tablet    AMOXIL    4 tablet    Take 4 tabs by mouth 1 hour prior to dental procedure.    Prophylactic antibiotic       aspirin 81 MG tablet      Take by mouth daily        calcium 1500 MG Tabs      1 po daily        dexamethasone 4 MG tablet    DECADRON    6 tablet    Take 1 tablet (4 mg) by mouth 2 times daily (with meals)    Altitude sickness prophylaxis       Garlic 1000 MG Caps      None Entered        GLUCOSAMINE-CHONDROITIN DS PO      Take by mouth daily        labetalol 200 MG tablet    NORMODYNE    180 tablet    Take 1 tablet (200 mg) by mouth 2 times daily    Hypertension goal BP (blood pressure) < 140/90       losartan 100 MG tablet    COZAAR     90 tablet    Take 1 tablet (100 mg) by mouth daily    Hypertension goal BP (blood pressure) < 140/90       MULTI-DAY vitamin  S Tabs      1 TABLET DAILY        VITAMIN E

## 2017-10-09 NOTE — PATIENT INSTRUCTIONS
-Please follow up with dermatology to discuss the spots on your forearm and to have a skin check.    Preventive Health Recommendations  Female Ages 65 +    Yearly exam:     See your health care provider every year in order to  o Review health changes.   o Discuss preventive care.    o Review your medicines if your doctor has prescribed any.      You no longer need a yearly Pap test unless you've had an abnormal Pap test in the past 10 years. If you have vaginal symptoms, such as bleeding or discharge, be sure to talk with your provider about a Pap test.      Every 1 to 2 years, have a mammogram.  If you are over 69, talk with your health care provider about whether or not you want to continue having screening mammograms.      Every 10 years, have a colonoscopy. Or, have a yearly FIT test (stool test). These exams will check for colon cancer.       Have a cholesterol test every 5 years, or more often if your doctor advises it.       Have a diabetes test (fasting glucose) every three years. If you are at risk for diabetes, you should have this test more often.       At age 65, have a bone density scan (DEXA) to check for osteoporosis (brittle bone disease).    Shots:    Get a flu shot each year.    Get a tetanus shot every 10 years.    Talk to your doctor about your pneumonia vaccines. There are now two you should receive - Pneumovax (PPSV 23) and Prevnar (PCV 13).    Talk to your doctor about the shingles vaccine.    Talk to your doctor about the hepatitis B vaccine.    Nutrition:     Eat at least 5 servings of fruits and vegetables each day.      Eat whole-grain bread, whole-wheat pasta and brown rice instead of white grains and rice.      Talk to your provider about Calcium and Vitamin D.     Lifestyle    Exercise at least 150 minutes a week (30 minutes a day, 5 days a week). This will help you control your weight and prevent disease.      Limit alcohol to one drink per day.      No smoking.       Wear sunscreen to  prevent skin cancer.       See your dentist twice a year for an exam and cleaning.      See your eye doctor every 1 to 2 years to screen for conditions such as glaucoma, macular degeneration, cataracts, etc

## 2017-10-09 NOTE — PROGRESS NOTES
SUBJECTIVE:   CC: Leah Peña is an 71 year old woman who presents for preventive health visit.     Physical   Annual:     Getting at least 3 servings of Calcium per day::  Yes    Bi-annual eye exam::  Yes    Dental care twice a year::  Yes    Sleep apnea or symptoms of sleep apnea::  None    Diet::  Regular (no restrictions)    Frequency of exercise::  4-5 days/week    Duration of exercise::  45-60 minutes    Taking medications regularly::  Yes    Medication side effects::  None      Rash      Duration: 1month    Description  Location: left arm  Itching: mild    Intensity:  mild    Accompanying signs and symptoms: redness, no bleeding, some drainage when it opens    History (similar episodes/previous evaluation): None    Precipitating or alleviating factors:  New exposures:  None  Recent travel: no      Therapies tried and outcome: bacatracin    Patient has been having problems with a rash on her left arm for the last month. There is mild itching but no pain, and patient describes the rash as red without bleeding, with some drainage when the lesions open. She has been treating the affected areas with bacitracin to some relief. She reports that she has been having problems with bed bugs. There are smaller red spots that are also itchy over both forearms which she attributes to the bed bugs. Patient has not seen a dermatologist. She has tried using claritin to some relief for the itching.    Patient says that she has been having increasingly bad aching in her joints this year, which she attributes to old age. History of osteoarthritis in her hips, and she also reports that her knees have also been giving her problems.    Patient has an elevated blood pressure today, but she reports that she has been checking her pressures regularly at the [facility] with an average reading around 130/70. She says that she has a history of white coat hypertension.    Today's PHQ-2 Score:   PHQ-2 ( 1999 Pf[employer]) 10/7/2017   Q1: Little  interest or pleasure in doing things 0   Q2: Feeling down, depressed or hopeless 0   PHQ-2 Score 0   Q1: Little interest or pleasure in doing things Not at all   Q2: Feeling down, depressed or hopeless Not at all   PHQ-2 Score 0       Abuse: Current or Past(Physical, Sexual or Emotional)- No  Do you feel safe in your environment - Yes    Social History   Substance Use Topics     Smoking status: Former Smoker     Packs/day: 1.00     Years: 10.00     Types: Cigarettes     Quit date: 11/14/1982     Smokeless tobacco: Never Used      Comment: quit 30+ years ago     Alcohol use Yes      Comment: social     The patient does not drink >3 drinks per day nor >7 drinks per week.    Reviewed orders with patient.  Reviewed health maintenance and updated orders accordingly - Yes  BP Readings from Last 3 Encounters:   10/09/17 (!) 143/96   04/18/17 152/90   09/20/16 147/88    Wt Readings from Last 3 Encounters:   10/09/17 72.5 kg (159 lb 12.8 oz)   04/18/17 73 kg (160 lb 14.4 oz)   02/09/17 68.6 kg (151 lb 3.2 oz)        Patient Active Problem List   Diagnosis     Hypertension goal BP (blood pressure) < 140/90     Advanced directives, counseling/discussion     Osteoarthritis of right hip     Colon polyp     Seasonal affective disorder (H)     CARDIOVASCULAR SCREENING; LDL GOAL LESS THAN 130     Osteoarthritis of hip     Urinary tract infection, site unspecified     Lumbar radicular pain     Past Surgical History:   Procedure Laterality Date     ARTHROPLASTY HIP  4/25/2012    Procedure:ARTHROPLASTY HIP; Left Total Hip Arthroplasty; Surgeon:NATHANIEL CASTRO; Location:[facility]     ARTHROPLASTY HIP Right 10/27/2014    Procedure: ARTHROPLASTY HIP;  Surgeon: Nathaniel Castro MD;  [facility]     C PELVIS/HIP JOINT SURGERY UNLISTED  2012 & 2014     C STOMACH SURGERY PROCEDURE UNLISTED  1991    Perferated ulcer     COLONOSCOPY  02/12    repeat 5 yrs     EXTRACAPSULAR CATARACT EXTRATION WITH INTRAOCULAR LENS IMPLANT  12/23/09     OPEN  REDUCTION INTERNAL FIXATION ANKLE      right     OPEN REDUCTION INTERNAL FIXATION WRIST      left     VAGOTOMY, PYLOROPLASTY, COMBINED         Social History   Substance Use Topics     Smoking status: Former Smoker     Packs/day: 1.00     Years: 10.00     Types: Cigarettes     Quit date: 1982     Smokeless tobacco: Never Used      Comment: quit 30+ years ago     Alcohol use Yes      Comment: social     Family History   Problem Relation Age of Onset     HEART DISEASE Brother      Coronary Artery Disease Brother      possibly agent orange related     Hypertension Mother        of srtoke     CEREBROVASCULAR DISEASE Mother      , , , , , , ,      Prostate Cancer Brother      Coronary Artery Disease Brother      possibly agent orange related     Prostate Cancer Brother      Substance Abuse No family hx of          Current Outpatient Prescriptions   Medication Sig Dispense Refill     aspirin 81 MG tablet Take by mouth daily       amLODIPine (NORVASC) 5 MG tablet Take 1 tablet (5 mg) by mouth daily 90 tablet 3     labetalol (NORMODYNE) 200 MG tablet Take 1 tablet (200 mg) by mouth 2 times daily 180 tablet 3     losartan (COZAAR) 100 MG tablet Take 1 tablet (100 mg) by mouth daily 90 tablet 3     acetaminophen (TYLENOL) 325 MG tablet Take 2 tablets (650 mg) by mouth every 6 hours as needed for mild pain 100 tablet 0     GLUCOSAMINE-CHONDROITIN DS PO Take by mouth daily       VITAMIN E        amoxicillin (AMOXIL) 500 MG tablet Take 4 tabs by mouth 1 hour prior to dental procedure. 4 tablet 4     CALCIUM 1500 MG OR TABS 1 po daily       MULTI-DAY VITAMINS OR TABS 1 TABLET DAILY       GARLIC 1000 MG OR CAPS None Entered       dexamethasone (DECADRON) 4 MG tablet Take 1 tablet (4 mg) by mouth 2 times daily (with meals) (Patient not taking: Reported on 10/9/2017) 6 tablet 0     Allergies   Allergen Reactions     Hctz Dermatitis     Vasotec Cough           Patient over  "age 50, mutual decision to screen reflected in health maintenance.      Pertinent mammograms are reviewed under the imaging tab.  History of abnormal Pap smear: NO - age 65 - see link Cervical Cytology Screening Guidelines      Reviewed and updated as needed this visit by clinical staff  Tobacco  Allergies  Meds         Reviewed and updated as needed this visit by Provider          ROS:  Positive for rash, sore hips and knees.    Denies headache, insomnia, chest pain, shortness of breath, cough, heartburn, bowel issues, bladder issues, neck pain, back pain, ankle pain, or foot pain. Remainder of ROS is negative unless otherwise noted above or in HPI.    This document serves as a record of the services and decisions personally performed and made by Raul Hobson MD. It was created on his behalf by Jose Morgan, a trained medical scribe. The creation of this document is based the provider's statements to the medical scribe.  Jose Morgan 10:51 AM October 9, 2017     OBJECTIVE:   BP (!) 143/96 (BP Location: Left arm)  Pulse 85  Temp 98.2  F (36.8  C) (Oral)  Resp 14  Ht 1.702 m (5' 7\")  Wt 72.5 kg (159 lb 12.8 oz)  SpO2 97%  BMI 25.03 kg/m2  EXAM:  GENERAL: healthy, alert and no distress  EYES: Eyes grossly normal to inspection, PERRL and conjunctivae and sclerae normal. EOMI.  HENT: ear canals and TM's normal, nose and mouth without ulcers or lesions  NECK: no adenopathy, no asymmetry, masses, or scars and thyroid normal to palpation. TMJ normal. No bruits.  RESP: lungs clear to auscultation - no rales, rhonchi or wheezes  CV: regular rate and rhythm, normal S1 S2, no S3 or S4, no murmur, click or rub, no peripheral edema and peripheral pulses strong  ABDOMEN: midline scar from sternum to just below umbilicus well healed, soft, nontender, no hepatosplenomegaly, no masses and bowel sounds normal  MS: no gross musculoskeletal defects noted, no edema. Calves nontender.  SKIN: slightly raised 6 mm " slightly irregular skin lesion on proximal left forearm with a central depression currently no bleeding, warm or tender, scattered papulosquamous lesions on both proximal forearms slightly red and excoriated  NEURO: Normal strength and tone, mentation intact and speech normal. Knee reflexes normal. No tremors.  PSYCH: mentation appears normal, affect normal/bright  LYMPH: no cervical, supraclavicular, axillary, or inguinal adenopathy    ASSESSMENT/PLAN:   (Z00.00) Encounter for routine adult health examination without abnormal findings  (primary encounter diagnosis)  Comment: Routine physical and labs completed today.  Plan: LIPID REFLEX TO DIRECT LDL PANEL        Follow up with results from lab.    (I10) Elevated blood pressure reading in office with white coat syndrome, with diagnosis of hypertension  Comment: Not at goal today, though patient has been checking at home with good readings. Controlled on amlodipine, labetalol and losartan. Labs completed today.  Plan: Comprehensive metabolic panel        Continue on current medications. Follow up with results from lab.    (F33.9) Seasonal affective disorder (H)  Comment: Stable and unchanged since last visit.  Plan: Will continue to monitor. Follow up as needed.    (Z23) Need for prophylactic vaccination and inoculation against influenza  Comment/Plan: Patient is planning on getting vaccinated at a future appointment.    (D48.5) Neoplasm of uncertain behavior of skin  Comment: Referral given for dermatology.  Plan: DERMATOLOGY REFERRAL        Follow up with dermatology.    Patient Instructions   -Please follow up with dermatology to discuss the spots on your forearm and to have a skin check.    Preventive Health Recommendations  Female Ages 65 +    Yearly exam:     See your health care provider every year in order to  o Review health changes.   o Discuss preventive care.    o Review your medicines if your doctor has prescribed any.      You no longer need a yearly  Pap test unless you've had an abnormal Pap test in the past 10 years. If you have vaginal symptoms, such as bleeding or discharge, be sure to talk with your provider about a Pap test.      Every 1 to 2 years, have a mammogram.  If you are over 69, talk with your health care provider about whether or not you want to continue having screening mammograms.      Every 10 years, have a colonoscopy. Or, have a yearly FIT test (stool test). These exams will check for colon cancer.       Have a cholesterol test every 5 years, or more often if your doctor advises it.       Have a diabetes test (fasting glucose) every three years. If you are at risk for diabetes, you should have this test more often.       At age 65, have a bone density scan (DEXA) to check for osteoporosis (brittle bone disease).    Shots:    Get a flu shot each year.    Get a tetanus shot every 10 years.    Talk to your doctor about your pneumonia vaccines. There are now two you should receive - Pneumovax (PPSV 23) and Prevnar (PCV 13).    Talk to your doctor about the shingles vaccine.    Talk to your doctor about the hepatitis B vaccine.    Nutrition:     Eat at least 5 servings of fruits and vegetables each day.      Eat whole-grain bread, whole-wheat pasta and brown rice instead of white grains and rice.      Talk to your provider about Calcium and Vitamin D.     Lifestyle    Exercise at least 150 minutes a week (30 minutes a day, 5 days a week). This will help you control your weight and prevent disease.      Limit alcohol to one drink per day.      No smoking.       Wear sunscreen to prevent skin cancer.       See your dentist twice a year for an exam and cleaning.      See your eye doctor every 1 to 2 years to screen for conditions such as glaucoma, macular degeneration, cataracts, etc       COUNSELING:  Reviewed preventive health counseling, as reflected in patient instructions     reports that she quit smoking about 34 years ago. Her smoking use  "included Cigarettes. She has a 10.00 pack-year smoking history. She has never used smokeless tobacco.  Estimated body mass index is 25.03 kg/(m^2) as calculated from the following:    Height as of this encounter: 1.702 m (5' 7\").    Weight as of this encounter: 72.5 kg (159 lb 12.8 oz).       The information in this document, created by the medical scribe for me, accurately reflects the services I personally performed and the decisions made by me. I have reviewed and approved this document for accuracy prior to leaving the patient care area.   Raul Hobson MD 10:50 AM October 9, 2017  [facility] [place]  Answers for HPI/ROS submitted by the patient on 10/7/2017   PHQ-2 Score: 0             "

## 2017-10-16 ENCOUNTER — TRANSFERRED RECORDS (OUTPATIENT)
Dept: HEALTH INFORMATION MANAGEMENT | Facility: CLINIC | Age: 72
End: 2017-10-16

## 2017-10-16 ASSESSMENT — PATIENT HEALTH QUESTIONNAIRE - PHQ9: SUM OF ALL RESPONSES TO PHQ QUESTIONS 1-9: 0

## 2017-10-26 NOTE — PROGRESS NOTES
Hi Leah: Although LDL cholesterol is down a little your lab results indicate a high risk (>20%) of heart attack or stroke over the next 10 years. Recommend atorvastatin 10 mg daily to help along with diet and exercise. Please let me know if you're willing to give it a try.    Raul    The 10-year ASCVD risk score (Pat SYLVESTER Jr, et al., 2013) is: 20.8%    Values used to calculate the score:      Age: 71 years      Sex: Female      Is Non- : No      Diabetic: No      Tobacco smoker: No      Systolic Blood Pressure: 143 mmHg      Is BP treated: Yes      HDL Cholesterol: 100 mg/dL      Total Cholesterol: 237 mg/dL

## 2017-10-27 ENCOUNTER — MYC MEDICAL ADVICE (OUTPATIENT)
Dept: FAMILY MEDICINE | Facility: CLINIC | Age: 72
End: 2017-10-27

## 2017-10-27 DIAGNOSIS — E78.00 HYPERCHOLESTEREMIA: Primary | ICD-10-CM

## 2017-10-27 DIAGNOSIS — I10 HYPERTENSION GOAL BP (BLOOD PRESSURE) < 140/90: ICD-10-CM

## 2017-10-27 RX ORDER — ATORVASTATIN CALCIUM 10 MG/1
10 TABLET, FILM COATED ORAL DAILY
Qty: 90 TABLET | Refills: 1 | Status: SHIPPED | OUTPATIENT
Start: 2017-10-27 | End: 2018-04-12

## 2017-10-27 NOTE — TELEPHONE ENCOUNTER
Route to provider pool    Dr. Hobson asked pt if she would be willing to start atorvastatin 10mg per her lab results    See pt message below    Medication cued    Marissa Watkins RN   [facility] [place] [facility] [place]

## 2018-03-14 DIAGNOSIS — M25.561 RIGHT KNEE PAIN: Primary | ICD-10-CM

## 2018-03-15 ENCOUNTER — OFFICE VISIT (OUTPATIENT)
Dept: ORTHOPEDICS | Facility: CLINIC | Age: 73
End: 2018-03-15
Payer: COMMERCIAL

## 2018-03-15 ENCOUNTER — RADIANT APPOINTMENT (OUTPATIENT)
Dept: GENERAL RADIOLOGY | Facility: CLINIC | Age: 73
End: 2018-03-15
Attending: ORTHOPAEDIC SURGERY
Payer: COMMERCIAL

## 2018-03-15 VITALS — HEIGHT: 66 IN | WEIGHT: 164.7 LBS | BODY MASS INDEX: 26.47 KG/M2

## 2018-03-15 DIAGNOSIS — M25.561 RIGHT KNEE PAIN: ICD-10-CM

## 2018-03-15 DIAGNOSIS — M17.11 PRIMARY OSTEOARTHRITIS OF RIGHT KNEE: Primary | ICD-10-CM

## 2018-03-15 ASSESSMENT — ACTIVITIES OF DAILY LIVING (ADL): FUNCTION,_DAILY_LIVING_SCORE: 61.76

## 2018-03-15 ASSESSMENT — KOOS JR
HOW SEVERE IS YOUR KNEE STIFFNESS AFTER FIRST WAKING IN MORNING: MODERATE
HOW SEVERE IS YOUR KNEE STIFFNESS AFTER FIRST WAKING IN MORNING: 1

## 2018-03-15 NOTE — NURSING NOTE
"Reason For Visit:   Chief Complaint   Patient presents with     RECHECK     Pt. states that she is here today for Right Knee Injection. She mention that about 2-3 weeks ago is when she started having pain again. Her last cortisone injection was on 02/09/2017, effective.        Pain Assessment  Patient Currently in Pain: Yes  0-10 Pain Scale: 2  Primary Pain Location: Knee  Pain Orientation: Right  Pain Descriptors: Discomfort, Aching  Alleviating Factors: Rest  Aggravating Factors: Movement, Standing, Walking               HEIGHT: 5' 5.748\", WEIGHT: 164 lbs 11.2 oz, BMI: Body mass index is 26.79 kg/(m^2).      Current Outpatient Prescriptions   Medication Sig Dispense Refill     atorvastatin (LIPITOR) 10 MG tablet Take 1 tablet (10 mg) by mouth daily 90 tablet 1     aspirin 81 MG tablet Take by mouth daily       amLODIPine (NORVASC) 5 MG tablet Take 1 tablet (5 mg) by mouth daily 90 tablet 3     labetalol (NORMODYNE) 200 MG tablet Take 1 tablet (200 mg) by mouth 2 times daily 180 tablet 3     losartan (COZAAR) 100 MG tablet Take 1 tablet (100 mg) by mouth daily 90 tablet 3     acetaminophen (TYLENOL) 325 MG tablet Take 2 tablets (650 mg) by mouth every 6 hours as needed for mild pain 100 tablet 0     GLUCOSAMINE-CHONDROITIN DS PO Take by mouth daily       VITAMIN E        amoxicillin (AMOXIL) 500 MG tablet Take 4 tabs by mouth 1 hour prior to dental procedure. 4 tablet 4     CALCIUM 1500 MG OR TABS 1 po daily       MULTI-DAY VITAMINS OR TABS 1 TABLET DAILY       GARLIC 1000 MG OR CAPS None Entered            Allergies   Allergen Reactions     Hctz Dermatitis     Vasotec Cough               "

## 2018-03-15 NOTE — PROGRESS NOTES
Here today for a repeat knee injection. Had her last one year ago and was very pleased with the effects. Was able to hike in the mountains in [place]. Is planning a trip to [place] soon and intends to do a lot of walking.  On examination the knee is benign, there is no effusion. She continues to have full extension.   Assessment: right knee OA being managed with injections  Plan: right knee steroid injection    Procedure Note:    After informed consent was obtained the patient's right knee was injected with 5 cc of lidocaine and 40 mg of kenolog using a superior-lateral approach.  Sterile technique was used.  The patient tolerated the procedure well.    Answers for HPI/ROS submitted by the patient on 3/2/2018   General Symptoms: No  Skin Symptoms: No  HENT Symptoms: No  EYE SYMPTOMS: No  HEART SYMPTOMS: No  LUNG SYMPTOMS: No  INTESTINAL SYMPTOMS: No  URINARY SYMPTOMS: No  GYNECOLOGIC SYMPTOMS: No  BREAST SYMPTOMS: No  SKELETAL SYMPTOMS: No  BLOOD SYMPTOMS: No  NERVOUS SYSTEM SYMPTOMS: No  MENTAL HEALTH SYMPTOMS: No

## 2018-03-15 NOTE — NURSING NOTE
[facility]  [address] [place] [id number]  Dept: [phone]  ______________________________________________________________________________    Patient: Leah Peña   : 1945   MRN: [id number]   March 15, 2018    INVASIVE PROCEDURE SAFETY CHECKLIST    Date: 3/15/2018   Procedure: Right Knee Cortisone Injection  Patient Name: Leah Peña  MRN: [id number]  YOB: 1945    Action: Complete sections as appropriate. Any discrepancy results in a HARD COPY until resolved.     PRE PROCEDURE:  Patient ID verified with 2 identifiers (name and  or MRN): Yes  Procedure and site verified with patient/designee (when able): Yes  Accurate consent documentation in medical record: Yes  H&P (or appropriate assessment) documented in medical record: Yes  H&P must be up to 20 days prior to procedure and updates within 24 hours of procedure as applicable: Yes  Relevant diagnostic and radiology test results appropriately labeled and displayed as applicable: Yes  Procedure site(s) marked with provider initials: Yes    TIMEOUT:  Time-Out performed immediately prior to starting procedure, including verbal and active participation of all team members addressing the following:Yes  * Correct patient identify  * Confirmed that the correct side and site are marked  * An accurate procedure consent form  * Agreement on the procedure to be done  * Correct patient position  * Relevant images and results are properly labeled and appropriately displayed  * The need to administer antibiotics or fluids for irrigation purposes during the procedure as applicable   * Safety precautions based on patient history or medication use    DURING PROCEDURE: Verification of correct person, site, and procedures any time the responsibility for care of the patient is transferred to another member of the care team.       The following medication was given:     MEDICATION:  Lidocaine without  epinephrine  ROUTE: IM  SITE: Right Knee  DOSE: 3cc  LOT #: [id number]  : [employer]  EXPIRATION DATE: 12/01/2019  NDC#: 7420-4720-09   Was there drug waste? Yes  Amount of drug waste (mL): 16.  Reason for waste:  As per MD    The following medication was given:     MEDICATION:  Kenalog 40 mg  ROUTE: IM  SITE: Right Knee  DOSE: 1cc  LOT #: [id number]  : [employer]  EXPIRATION DATE: 09/2019  NDC#: 0037-2559-34   Was there drug waste? No      Livia Pierre MA  March 15, 2018

## 2018-03-15 NOTE — LETTER
3/15/2018       RE: Leah Peña  [address]    [place] [id number]     Dear Colleague,    Thank you for referring your patient, Leah Peña, to the [facility] at [facility]. Please see a copy of my visit note below.    Here today for a repeat knee injection. Had her last one year ago and was very pleased with the effects. Was able to hike in the mountains in [place]. Is planning a trip to [place] soon and intends to do a lot of walking.  On examination the knee is benign, there is no effusion. She continues to have full extension.   Assessment: right knee OA being managed with injections  Plan: right knee steroid injection    Procedure Note:    After informed consent was obtained the patient's right knee was injected with 5 cc of lidocaine and 40 mg of kenolog using a superior-lateral approach.  Sterile technique was used.  The patient tolerated the procedure well.        Again, thank you for allowing me to participate in the care of your patient.      Sincerely,    Nathaniel Epperson MD

## 2018-03-15 NOTE — MR AVS SNAPSHOT
"              After Visit Summary   3/15/2018    Leah Peña    MRN: [id number]           Patient Information     Date Of Birth          1945        Visit Information        Provider Department      3/15/2018 5:00 PM Nathaniel Epperson MD [facility]        Today's Diagnoses     Primary osteoarthritis of right knee    -  1       Follow-ups after your visit        Who to contact     Please call your clinic at [phone] to:    Ask questions about your health    Make or cancel appointments    Discuss your medicines    Learn about your test results    Speak to your doctor            Additional Information About Your Visit        [employer] Information     [employer] gives you secure access to your electronic health record. If you see a primary care provider, you can also send messages to your care team and make appointments. If you have questions, please call your primary care clinic.  If you do not have a primary care provider, please call [phone] and they will assist you.      [employer] is an electronic gateway that provides easy, online access to your medical records. With [employer], you can request a clinic appointment, read your test results, renew a prescription or communicate with your care team.     To access your existing account, please contact your [facility] or call [phone] for assistance.        Care EveryWhere ID     This is your Care EveryWhere ID. This could be used by other organizations to access your [place] medical records  [id number]        Your Vitals Were     Height BMI (Body Mass Index)                1.67 m (5' 5.75\") 26.79 kg/m2           Blood Pressure from Last 3 Encounters:   10/09/17 (!) 143/96   04/18/17 152/90   09/20/16 147/88    Weight from Last 3 Encounters:   03/15/18 74.7 kg (164 lb 11.2 oz)   10/09/17 72.5 kg (159 lb 12.8 oz)   04/18/17 73 kg (160 lb 14.4 oz)              Today, you had the following     No orders found " for display       Primary Care Provider Office Phone # Fax #    Raul Hobson [phone]       [address]  [facility] [address]        Equal Access to Services     TERESA MELENDEZ : Lizzette panfilo cohen cateo Sojono, waaxda luqadaha, qaybta kaalmada adeegyada, nano paige priti wheat. So [facility] [phone].    ATENCIÓN: Si habla español, tiene a ruiz disposición servicios gratuitos de asistencia lingüística. Llame al [phone].    We comply with applicable federal civil rights laws and [place] laws. We do not discriminate on the basis of race, color, national origin, age, disability, sex, sexual orientation, or gender identity.            Thank you!     Thank you for choosing [facility]  for your care. Our goal is always to provide you with excellent care. Hearing back from our patients is one way we can continue to improve our services. Please take a few minutes to complete the written survey that you may receive in the mail after your visit with us. Thank you!             Your Updated Medication List - Protect others around you: Learn how to safely use, store and throw away your medicines at www.disposemymeds.org.          This list is accurate as of 3/15/18  5:36 PM.  Always use your most recent med list.                   Brand Name Dispense Instructions for use Diagnosis    acetaminophen 325 MG tablet    TYLENOL    100 tablet    Take 2 tablets (650 mg) by mouth every 6 hours as needed for mild pain    Osteoarthritis of right hip       amLODIPine 5 MG tablet    NORVASC    90 tablet    Take 1 tablet (5 mg) by mouth daily    Benign essential hypertension       amoxicillin 500 MG tablet    AMOXIL    4 tablet    Take 4 tabs by mouth 1 hour prior to dental procedure.    Prophylactic antibiotic       aspirin 81 MG tablet      Take by mouth daily        atorvastatin 10 MG tablet    LIPITOR    90 tablet    Take 1 tablet (10 mg) by mouth daily     Hypercholesteremia, Hypertension goal BP (blood pressure) < 140/90       calcium 1500 MG Tabs      1 po daily        Garlic 1000 MG Caps      None Entered        GLUCOSAMINE-CHONDROITIN DS PO      Take by mouth daily        labetalol 200 MG tablet    NORMODYNE    180 tablet    Take 1 tablet (200 mg) by mouth 2 times daily    Hypertension goal BP (blood pressure) < 140/90       losartan 100 MG tablet    COZAAR    90 tablet    Take 1 tablet (100 mg) by mouth daily    Hypertension goal BP (blood pressure) < 140/90       MULTI-DAY vitamin  S Tabs      1 TABLET DAILY        VITAMIN E

## 2018-04-12 DIAGNOSIS — E78.00 HYPERCHOLESTEREMIA: ICD-10-CM

## 2018-04-12 DIAGNOSIS — I10 HYPERTENSION GOAL BP (BLOOD PRESSURE) < 140/90: ICD-10-CM

## 2018-04-12 RX ORDER — ATORVASTATIN CALCIUM 10 MG/1
TABLET, FILM COATED ORAL
Qty: 90 TABLET | Refills: 1 | Status: SHIPPED | OUTPATIENT
Start: 2018-04-12 | End: 2018-10-01

## 2018-04-12 NOTE — TELEPHONE ENCOUNTER
Prescription approved per [facility].    Aurora Garces, BSN RN  [facility]

## 2018-04-17 DIAGNOSIS — I10 HYPERTENSION GOAL BP (BLOOD PRESSURE) < 140/90: ICD-10-CM

## 2018-04-18 RX ORDER — LOSARTAN POTASSIUM 100 MG/1
100 TABLET ORAL DAILY
Qty: 90 TABLET | Refills: 3 | Status: SHIPPED | OUTPATIENT
Start: 2018-04-18 | End: 2018-11-12

## 2018-04-18 RX ORDER — LABETALOL 200 MG/1
200 TABLET, FILM COATED ORAL 2 TIMES DAILY
Qty: 180 TABLET | Refills: 3 | Status: SHIPPED | OUTPATIENT
Start: 2018-04-18 | End: 2018-11-12

## 2018-06-06 DIAGNOSIS — I10 BENIGN ESSENTIAL HYPERTENSION: ICD-10-CM

## 2018-06-06 DIAGNOSIS — I10 HYPERTENSION GOAL BP (BLOOD PRESSURE) < 140/90: ICD-10-CM

## 2018-06-07 RX ORDER — AMLODIPINE BESYLATE 5 MG/1
5 TABLET ORAL DAILY
Qty: 90 TABLET | Refills: 1 | Status: SHIPPED | OUTPATIENT
Start: 2018-06-07 | End: 2018-11-12

## 2018-07-05 ENCOUNTER — TELEPHONE (OUTPATIENT)
Dept: FAMILY MEDICINE | Facility: CLINIC | Age: 73
End: 2018-07-05

## 2018-07-05 NOTE — TELEPHONE ENCOUNTER
Dr. Hobson-    Patient received a letter from clinic she is due for a BP check. She reports she is out of town on Monday for six weeks.    She provided the following home BP readings.     121/69  130/74  123/66  142/78-last one that was this high was January 2018  126/71  125/75    Patient is wondering if it is okay to wait for BP check until her physical this Fall or if she should be seen sooner for a BP nurse only visit?     Please advise.     Aurora Garces, NADIRN RN  [facility]

## 2018-08-27 DIAGNOSIS — M19.90 OSTEOARTHRITIS: Primary | ICD-10-CM

## 2018-08-30 ENCOUNTER — OFFICE VISIT (OUTPATIENT)
Dept: ORTHOPEDICS | Facility: CLINIC | Age: 73
End: 2018-08-30
Payer: COMMERCIAL

## 2018-08-30 ENCOUNTER — RADIANT APPOINTMENT (OUTPATIENT)
Dept: GENERAL RADIOLOGY | Facility: CLINIC | Age: 73
End: 2018-08-30
Attending: ORTHOPAEDIC SURGERY
Payer: COMMERCIAL

## 2018-08-30 VITALS — HEIGHT: 66 IN | WEIGHT: 165.2 LBS | BODY MASS INDEX: 26.55 KG/M2

## 2018-08-30 DIAGNOSIS — G89.29 CHRONIC PAIN OF RIGHT KNEE: Primary | ICD-10-CM

## 2018-08-30 DIAGNOSIS — M19.90 OSTEOARTHRITIS: ICD-10-CM

## 2018-08-30 DIAGNOSIS — M25.561 CHRONIC PAIN OF RIGHT KNEE: Primary | ICD-10-CM

## 2018-08-30 RX ADMIN — TRIAMCINOLONE ACETONIDE 40 MG: 40 INJECTION, SUSPENSION INTRA-ARTICULAR; INTRAMUSCULAR at 18:49

## 2018-08-30 RX ADMIN — LIDOCAINE HYDROCHLORIDE 5 ML: 10 INJECTION, SOLUTION INFILTRATION; PERINEURAL at 18:49

## 2018-08-30 ASSESSMENT — KOOS JR: HOW SEVERE IS YOUR KNEE STIFFNESS AFTER FIRST WAKING IN MORNING: MODERATE

## 2018-08-30 ASSESSMENT — ACTIVITIES OF DAILY LIVING (ADL): FUNCTION,_DAILY_LIVING_SCORE: 72.05

## 2018-08-30 NOTE — LETTER
8/30/2018       RE: Leah Peña  [address]  [address]  [facility] [address]     Dear Colleague,    Thank you for referring your patient, Leah Peña, to the [facility]. Please see a copy of my visit note below.    Large Joint Injection/Arthocentesis  Date/Time: 8/30/2018 6:49 PM  Performed by: JASS CASTRO  Authorized by: JASS CASTRO     Indications:  Pain  Needle Size:  22 G  Guidance: landmark guided    Approach:  Lateral  Location:  Knee  Site:  R knee joint  Medications:  5 mL lidocaine 1 %; 40 mg triamcinolone acetonide 40 MG/ML  Procedure discussed: discussed risks, benefits, and alternatives    Consent Given by:  Patient  Timeout: timeout called immediately prior to procedure    Prep: patient was prepped and draped in usual sterile fashion     Med Waste: 1% Lidocaine 15 mL        Chief Complaint: Pain of the Right Knee (Right knee pain)       HPI: Leah Peña returns today in follow-up for her right knee. She presents for discussion of an intra-articular steroid injection.   She reports that she had good relief from her last injection and would like to have this repeated today.    Medications and allergies are documented in the EMR and have been reviewed.    Current Outpatient Prescriptions:      acetaminophen (TYLENOL) 325 MG tablet, Take 2 tablets (650 mg) by mouth every 6 hours as needed for mild pain, Disp: 100 tablet, Rfl: 0     amLODIPine (NORVASC) 5 MG tablet, Take 1 tablet (5 mg) by mouth daily, Disp: 90 tablet, Rfl: 1     amoxicillin (AMOXIL) 500 MG tablet, Take 4 tabs by mouth 1 hour prior to dental procedure., Disp: 4 tablet, Rfl: 4     aspirin 81 MG tablet, Take by mouth daily, Disp: , Rfl:      atorvastatin (LIPITOR) 10 MG tablet, TAKE 1 TABLET BY MOUTH EVERY DAY, Disp: 90 tablet, Rfl: 0     CALCIUM 1500 MG OR TABS, 1 po daily, Disp: , Rfl:      GARLIC 1000 MG OR CAPS, None Entered, Disp: , Rfl:       "GLUCOSAMINE-CHONDROITIN DS PO, Take by mouth daily, Disp: , Rfl:      labetalol (NORMODYNE) 200 MG tablet, Take 1 tablet (200 mg) by mouth 2 times daily, Disp: 180 tablet, Rfl: 3     losartan (COZAAR) 100 MG tablet, Take 1 tablet (100 mg) by mouth daily, Disp: 90 tablet, Rfl: 3     MULTI-DAY VITAMINS OR TABS, 1 TABLET DAILY, Disp: , Rfl:      VITAMIN E, , Disp: , Rfl:   Allergies: Hctz and Vasotec    Physical Exam:  On physical examination the patient appears the stated age, is in no acute distress, affect is appropriate, and breathing is non-labored.  Ht 1.67 m (5' 5.75\")  Wt 74.9 kg (165 lb 3.2 oz)  BMI 26.87 kg/m2  Body mass index is 26.87 kg/(m^2).  He knee is benign appearance without redness or swelling.     X-rays:    Final Report   3 views right knee radiographs 8/30/2018 6:35 PM    History: ; Osteoarthritis    Comparison: March 15, 2018    Findings:    AP, lateral and patellofemoral views of the right knee were obtained.     No acute osseous abnormality. Small joint effusion.    No substantial degenerative change. Mild medial compartment joint  space loss. Endobutton osteophyte versus small joint body at the  intercondylar notch medial margin.    Trace lateral patellar subluxation with moderate to severe lateral  joint space loss. No patellar triny or baja.    Vascular calcifications.    Impression:  1. No acute osseous abnormality.  2. Degenerative changes most pronounced in patellofemoral compartment.  Differential consideration include CPPD arthropathy as well as  patellar maltracking.      VANGIE KHALIL      Principal   Name: VANGIE KHALIL  Provider ID: [id number]    Assessment: knee osteoarthritis being reasonably managed with non-operative management   Plan: repeat steroid injection right knee.     Procedure Note:    After informed consent was obtained the patient's right knee was injected with 5 cc of lidocaine and 40 mg of kenolog using a superior-medial approach.  Sterile technique " was used.  The patient tolerated the procedure well.      Referred Self      Again, thank you for allowing me to participate in the care of your patient.      Sincerely,    Nathaniel Epperson MD

## 2018-08-30 NOTE — MR AVS SNAPSHOT
"              After Visit Summary   8/30/2018    Leah Peña    MRN: [id number]           Patient Information     Date Of Birth          1945        Visit Information        Provider Department      8/30/2018 6:00 PM Nathaniel Epperson MD [facility]        Today's Diagnoses     Chronic pain of right knee    -  1       Follow-ups after your visit        Who to contact     Please call your clinic at [phone] to:    Ask questions about your health    Make or cancel appointments    Discuss your medicines    Learn about your test results    Speak to your doctor            Additional Information About Your Visit        [employer] Information     [employer] gives you secure access to your electronic health record. If you see a primary care provider, you can also send messages to your care team and make appointments. If you have questions, please call your primary care clinic.  If you do not have a primary care provider, please call [phone] and they will assist you.      [employer] is an electronic gateway that provides easy, online access to your medical records. With [employer], you can request a clinic appointment, read your test results, renew a prescription or communicate with your care team.     To access your existing account, please contact your [facility] or call [phone] for assistance.        Care EveryWhere ID     This is your Care EveryWhere ID. This could be used by other organizations to access your [facility] medical records  [id number]        Your Vitals Were     Height BMI (Body Mass Index)                1.67 m (5' 5.75\") 26.87 kg/m2           Blood Pressure from Last 3 Encounters:   10/09/17 (!) 143/96   04/18/17 152/90   09/20/16 147/88    Weight from Last 3 Encounters:   08/30/18 74.9 kg (165 lb 3.2 oz)   03/15/18 74.7 kg (164 lb 11.2 oz)   10/09/17 72.5 kg (159 lb 12.8 oz)              We Performed the Following     Large Joint " Injection/Arthocentesis        Primary Care Provider Office Phone # Fax #    Raul Hobson [phone]       [address] [facility] [address]  [facility] [address]        Equal Access to Services     TERESA MELENDEZ : Hadanh panfilo ku cateo Soomaali, waaxda luqadaha, qaybta kaalmada adealdo, nano paige laBrooklizette wheat. So [facility] [phone].    ATENCIÓN: Si habla español, tiene a ruiz disposición servicios gratuitos de asistencia lingüística. Llame al [phone].    We comply with applicable federal civil rights laws and [place] laws. We do not discriminate on the basis of race, color, national origin, age, disability, sex, sexual orientation, or gender identity.            Thank you!     Thank you for choosing [facility]  for your care. Our goal is always to provide you with excellent care. Hearing back from our patients is one way we can continue to improve our services. Please take a few minutes to complete the written survey that you may receive in the mail after your visit with us. Thank you!             Your Updated Medication List - Protect others around you: Learn how to safely use, store and throw away your medicines at www.disposemymeds.org.          This list is accurate as of 8/30/18 11:59 PM.  Always use your most recent med list.                   Brand Name Dispense Instructions for use Diagnosis    acetaminophen 325 MG tablet    TYLENOL    100 tablet    Take 2 tablets (650 mg) by mouth every 6 hours as needed for mild pain    Osteoarthritis of right hip       amLODIPine 5 MG tablet    NORVASC    90 tablet    Take 1 tablet (5 mg) by mouth daily    Benign essential hypertension       amoxicillin 500 MG tablet    AMOXIL    4 tablet    Take 4 tabs by mouth 1 hour prior to dental procedure.    Prophylactic antibiotic       aspirin 81 MG tablet      Take by mouth daily        calcium 1500 MG Tabs      1 po daily        Garlic 1000 MG Caps      None Entered         GLUCOSAMINE-CHONDROITIN DS PO      Take by mouth daily        labetalol 200 MG tablet    NORMODYNE    180 tablet    Take 1 tablet (200 mg) by mouth 2 times daily    Hypertension goal BP (blood pressure) < 140/90       losartan 100 MG tablet    COZAAR    90 tablet    Take 1 tablet (100 mg) by mouth daily    Hypertension goal BP (blood pressure) < 140/90       MULTI-DAY vitamin  S Tabs      1 TABLET DAILY        VITAMIN E

## 2018-08-30 NOTE — PROGRESS NOTES
Large Joint Injection/Arthocentesis  Date/Time: 8/30/2018 6:49 PM  Performed by: JASS CASTRO  Authorized by: JASS CASTRO     Indications:  Pain  Needle Size:  22 G  Guidance: landmark guided    Approach:  Lateral  Location:  Knee  Site:  R knee joint  Medications:  5 mL lidocaine 1 %; 40 mg triamcinolone acetonide 40 MG/ML  Procedure discussed: discussed risks, benefits, and alternatives    Consent Given by:  Patient  Timeout: timeout called immediately prior to procedure    Prep: patient was prepped and draped in usual sterile fashion     Med Waste: 1% Lidocaine 15 mL

## 2018-08-30 NOTE — NURSING NOTE
"Reason For Visit:   Chief Complaint   Patient presents with     Right Knee - Pain     Right knee pain       Ht 1.67 m (5' 5.75\")  Wt 74.9 kg (165 lb 3.2 oz)  BMI 26.87 kg/m2    Pain Assessment  Patient Currently in Pain: Yes  0-10 Pain Scale: 4  Primary Pain Location: Knee  Pain Orientation: Right  Pain Descriptors: Constant  Aggravating Factors: Stairs    Nahun Garcia ATC  "

## 2018-10-01 DIAGNOSIS — I10 HYPERTENSION GOAL BP (BLOOD PRESSURE) < 140/90: ICD-10-CM

## 2018-10-01 DIAGNOSIS — E78.00 HYPERCHOLESTEREMIA: ICD-10-CM

## 2018-10-01 NOTE — TELEPHONE ENCOUNTER
"Requested Prescriptions   Pending Prescriptions Disp Refills     atorvastatin (LIPITOR) 10 MG tablet [Pharmacy Med Name: ATORVASTATIN 10 MG TABLET] 90 tablet 1    Last Written Prescription Date:  04/12/2018  Last Fill Quantity: 90,  # refills: 1   Last office visit: 10/9/2017 with prescribing provider:  10/09/2017   Future Office Visit:     Sig: TAKE 1 TABLET BY MOUTH EVERY DAY    Statins Protocol Passed    10/1/2018  1:55 AM       Passed - LDL on file in past 12 months    Recent Labs   Lab Test  10/09/17   1113   LDL  124*            Passed - No abnormal creatine kinase in past 12 months    No lab results found.            Passed - Recent (12 mo) or future (30 days) visit within the authorizing provider's specialty    Patient had office visit in the last 12 months or has a visit in the next 30 days with authorizing provider or within the authorizing provider's specialty.  See \"Patient Info\" tab in inbasket, or \"Choose Columns\" in Meds & Orders section of the refill encounter.           Passed - Patient is age 18 or older       Passed - No active pregnancy on record       Passed - No positive pregnancy test in past 12 months          "

## 2018-10-02 RX ORDER — ATORVASTATIN CALCIUM 10 MG/1
TABLET, FILM COATED ORAL
Qty: 90 TABLET | Refills: 0 | Status: SHIPPED | OUTPATIENT
Start: 2018-10-02 | End: 2018-11-12

## 2018-10-02 NOTE — TELEPHONE ENCOUNTER
Called patient. Due for OV. Left message to call clinic. Prescription approved per [facility], guido refill given for 90 days.     Brianne Ann RN  [facility]

## 2018-10-03 NOTE — TELEPHONE ENCOUNTER
Pt informed medication was refilled 1 time only, and no further refills would be prescribed until she schedules an office visit. She will call back to schedule as she was uncertain of her upcoming availability.

## 2018-10-08 ENCOUNTER — TRANSFERRED RECORDS (OUTPATIENT)
Dept: HEALTH INFORMATION MANAGEMENT | Facility: CLINIC | Age: 73
End: 2018-10-08

## 2018-10-12 RX ORDER — TRIAMCINOLONE ACETONIDE 40 MG/ML
40 INJECTION, SUSPENSION INTRA-ARTICULAR; INTRAMUSCULAR
Status: SHIPPED | OUTPATIENT
Start: 2018-08-30

## 2018-10-12 RX ORDER — LIDOCAINE HYDROCHLORIDE 10 MG/ML
5 INJECTION, SOLUTION INFILTRATION; PERINEURAL
Status: SHIPPED | OUTPATIENT
Start: 2018-08-30

## 2018-10-12 NOTE — PROGRESS NOTES
"Chief Complaint: Pain of the Right Knee (Right knee pain)       HPI: Leah Peña returns today in follow-up for her right knee. She presents for discussion of an intra-articular steroid injection.   She reports that she had good relief from her last injection and would like to have this repeated today.    Medications and allergies are documented in the EMR and have been reviewed.    Current Outpatient Prescriptions:      acetaminophen (TYLENOL) 325 MG tablet, Take 2 tablets (650 mg) by mouth every 6 hours as needed for mild pain, Disp: 100 tablet, Rfl: 0     amLODIPine (NORVASC) 5 MG tablet, Take 1 tablet (5 mg) by mouth daily, Disp: 90 tablet, Rfl: 1     amoxicillin (AMOXIL) 500 MG tablet, Take 4 tabs by mouth 1 hour prior to dental procedure., Disp: 4 tablet, Rfl: 4     aspirin 81 MG tablet, Take by mouth daily, Disp: , Rfl:      atorvastatin (LIPITOR) 10 MG tablet, TAKE 1 TABLET BY MOUTH EVERY DAY, Disp: 90 tablet, Rfl: 0     CALCIUM 1500 MG OR TABS, 1 po daily, Disp: , Rfl:      GARLIC 1000 MG OR CAPS, None Entered, Disp: , Rfl:      GLUCOSAMINE-CHONDROITIN DS PO, Take by mouth daily, Disp: , Rfl:      labetalol (NORMODYNE) 200 MG tablet, Take 1 tablet (200 mg) by mouth 2 times daily, Disp: 180 tablet, Rfl: 3     losartan (COZAAR) 100 MG tablet, Take 1 tablet (100 mg) by mouth daily, Disp: 90 tablet, Rfl: 3     MULTI-DAY VITAMINS OR TABS, 1 TABLET DAILY, Disp: , Rfl:      VITAMIN E, , Disp: , Rfl:   Allergies: Hctz and Vasotec    Physical Exam:  On physical examination the patient appears the stated age, is in no acute distress, affect is appropriate, and breathing is non-labored.  Ht 1.67 m (5' 5.75\")  Wt 74.9 kg (165 lb 3.2 oz)  BMI 26.87 kg/m2  Body mass index is 26.87 kg/(m^2).  He knee is benign appearance without redness or swelling.     X-rays:    Final Report   3 views right knee radiographs 8/30/2018 6:35 PM    History: ; Osteoarthritis    Comparison: March 15, 2018    Findings:    AP, lateral and " patellofemoral views of the right knee were obtained.     No acute osseous abnormality. Small joint effusion.    No substantial degenerative change. Mild medial compartment joint  space loss. Endobutton osteophyte versus small joint body at the  intercondylar notch medial margin.    Trace lateral patellar subluxation with moderate to severe lateral  joint space loss. No patellar triny or baja.    Vascular calcifications.    Impression:  1. No acute osseous abnormality.  2. Degenerative changes most pronounced in patellofemoral compartment.  Differential consideration include CPPD arthropathy as well as  patellar maltracking.      VANGIE KHALIL      Principal   Name: VANGIE KINGASHI  Provider ID: [id number]    Assessment: knee osteoarthritis being reasonably managed with non-operative management   Plan: repeat steroid injection right knee.     Procedure Note:    After informed consent was obtained the patient's right knee was injected with 5 cc of lidocaine and 40 mg of kenolog using a superior-medial approach.  Sterile technique was used.  The patient tolerated the procedure well.      Referred Self    Answers for HPI/ROS submitted by the patient on 8/29/2018   General Symptoms: No  Skin Symptoms: No  HENT Symptoms: No  EYE SYMPTOMS: No  HEART SYMPTOMS: No  LUNG SYMPTOMS: No  INTESTINAL SYMPTOMS: No  URINARY SYMPTOMS: No  GYNECOLOGIC SYMPTOMS: No  BREAST SYMPTOMS: No  SKELETAL SYMPTOMS: No  BLOOD SYMPTOMS: No  NERVOUS SYSTEM SYMPTOMS: No  MENTAL HEALTH SYMPTOMS: No

## 2018-11-11 ASSESSMENT — ENCOUNTER SYMPTOMS
HEARTBURN: 0
CHILLS: 0
SHORTNESS OF BREATH: 0
JOINT SWELLING: 0
WEAKNESS: 0
ABDOMINAL PAIN: 0
EYE PAIN: 0
FEVER: 0
FREQUENCY: 0
BREAST MASS: 0
HEMATURIA: 0
PALPITATIONS: 0
DIARRHEA: 0
PARESTHESIAS: 0
ARTHRALGIAS: 0
CONSTIPATION: 0
NAUSEA: 0
DYSURIA: 0
HEMATOCHEZIA: 0
COUGH: 0
DIZZINESS: 0
MYALGIAS: 0
HEADACHES: 0
NERVOUS/ANXIOUS: 0

## 2018-11-11 ASSESSMENT — ACTIVITIES OF DAILY LIVING (ADL): CURRENT_FUNCTION: NO ASSISTANCE NEEDED

## 2018-11-12 ENCOUNTER — OFFICE VISIT (OUTPATIENT)
Dept: FAMILY MEDICINE | Facility: CLINIC | Age: 73
End: 2018-11-12
Payer: COMMERCIAL

## 2018-11-12 VITALS
OXYGEN SATURATION: 98 % | HEART RATE: 70 BPM | TEMPERATURE: 97.5 F | DIASTOLIC BLOOD PRESSURE: 76 MMHG | WEIGHT: 162.7 LBS | BODY MASS INDEX: 26.46 KG/M2 | SYSTOLIC BLOOD PRESSURE: 140 MMHG

## 2018-11-12 DIAGNOSIS — I10 HYPERTENSION GOAL BP (BLOOD PRESSURE) < 140/90: ICD-10-CM

## 2018-11-12 DIAGNOSIS — Z00.00 MEDICARE ANNUAL WELLNESS VISIT, SUBSEQUENT: Primary | ICD-10-CM

## 2018-11-12 DIAGNOSIS — F33.8 SEASONAL AFFECTIVE DISORDER (H): ICD-10-CM

## 2018-11-12 DIAGNOSIS — E78.00 HYPERCHOLESTEREMIA: ICD-10-CM

## 2018-11-12 DIAGNOSIS — I10 BENIGN ESSENTIAL HYPERTENSION: ICD-10-CM

## 2018-11-12 PROCEDURE — G0439 PPPS, SUBSEQ VISIT: HCPCS | Performed by: FAMILY MEDICINE

## 2018-11-12 PROCEDURE — 80061 LIPID PANEL: CPT | Performed by: FAMILY MEDICINE

## 2018-11-12 PROCEDURE — 80053 COMPREHEN METABOLIC PANEL: CPT | Performed by: FAMILY MEDICINE

## 2018-11-12 PROCEDURE — 36415 COLL VENOUS BLD VENIPUNCTURE: CPT | Performed by: FAMILY MEDICINE

## 2018-11-12 RX ORDER — LOSARTAN POTASSIUM 100 MG/1
100 TABLET ORAL DAILY
Qty: 90 TABLET | Refills: 3 | Status: SHIPPED | OUTPATIENT
Start: 2018-11-12 | End: 2019-12-23

## 2018-11-12 RX ORDER — AMLODIPINE BESYLATE 5 MG/1
5 TABLET ORAL DAILY
Qty: 90 TABLET | Refills: 3 | Status: SHIPPED | OUTPATIENT
Start: 2018-11-12 | End: 2019-11-25

## 2018-11-12 RX ORDER — LABETALOL 200 MG/1
200 TABLET, FILM COATED ORAL 2 TIMES DAILY
Qty: 180 TABLET | Refills: 3 | Status: SHIPPED | OUTPATIENT
Start: 2018-11-12 | End: 2020-01-08

## 2018-11-12 RX ORDER — ATORVASTATIN CALCIUM 10 MG/1
10 TABLET, FILM COATED ORAL DAILY
Qty: 90 TABLET | Refills: 3 | Status: SHIPPED | OUTPATIENT
Start: 2018-11-12 | End: 2019-12-17

## 2018-11-12 ASSESSMENT — PATIENT HEALTH QUESTIONNAIRE - PHQ9: SUM OF ALL RESPONSES TO PHQ QUESTIONS 1-9: 0

## 2018-11-12 NOTE — PATIENT INSTRUCTIONS
Preventive Health Recommendations    See your health care provider every year to    Review health changes.     Discuss preventive care.      Review your medicines if your doctor has prescribed any.      You no longer need a yearly Pap test unless you've had an abnormal Pap test in the past 10 years. If you have vaginal symptoms, such as bleeding or discharge, be sure to talk with your provider about a Pap test.      Every 1 to 2 years, have a mammogram.  If you are over 69, talk with your health care provider about whether or not you want to continue having screening mammograms.      Every 10 years, have a colonoscopy. Or, have a yearly FIT test (stool test). These exams will check for colon cancer.       Have a cholesterol test every 5 years, or more often if your doctor advises it.       Have a diabetes test (fasting glucose) every three years. If you are at risk for diabetes, you should have this test more often.       At age 65, have a bone density scan (DEXA) to check for osteoporosis (brittle bone disease).    Shots:    Get a flu shot each year.    Get a tetanus shot every 10 years.    Talk to your doctor about your pneumonia vaccines. There are now two you should receive - Pneumovax (PPSV 23) and Prevnar (PCV 13).    Talk to your pharmacist about the shingles vaccine.    Talk to your doctor about the hepatitis B vaccine.    Nutrition:     Eat at least 5 servings of fruits and vegetables each day.      Eat whole-grain bread, whole-wheat pasta and brown rice instead of white grains and rice.      Get adequate Calcium and Vitamin D.     Lifestyle    Exercise at least 150 minutes a week (30 minutes a day, 5 days a week). This will help you control your weight and prevent disease.      Limit alcohol to one drink per day.      No smoking.       Wear sunscreen to prevent skin cancer.       See your dentist twice a year for an exam and cleaning.      See your eye doctor every 1 to 2 years to screen for conditions  such as glaucoma, macular degeneration and cataracts.    Personalized Prevention Plan  You are due for the preventive services outlined below.  Your care team is available to assist you in scheduling these services.  If you have already completed any of these items, please share that information with your care team to update in your medical record.  Health Maintenance Due   Topic Date Due     Depression Action Plan Review - yearly  11/14/1963     Tetanus Vaccine - every 10 years  01/22/2018     Depression Assessment - every 6 months  04/09/2018     Discuss Advance Directive Planning  08/02/2018     Flu Vaccine (1) 09/01/2018     FALL RISK ASSESSMENT  10/09/2018

## 2018-11-12 NOTE — PROGRESS NOTES
"  SUBJECTIVE:   Leah Peña is a 72 year old female who presents for Preventive Visit.  Are you in the first 12 months of your Medicare Part B coverage?  No    Answers for HPI/ROS submitted by the patient on 11/11/2018   Annual Exam:  In general, how would you rate your overall physical health?: good  Frequency of exercise:: 4-5 days/week  Do you usually eat at least 4 servings of fruit and vegetables a day, include whole grains & fiber, and avoid regularly eating high fat or \"junk\" foods? : Yes  Taking medications regularly:: Yes  Medication side effects:: None  Activities of Daily Living: no assistance needed  Home safety: no safety concerns identified  Hearing Impairment:: no hearing concerns  In the past 6 months, have you been bothered by leaking of urine?: Yes  abdominal pain: No  Blood in stool: No  Blood in urine: No  chest pain: No  chills: No  congestion: No  constipation: No  cough: No  diarrhea: No  dizziness: No  ear pain: No  eye pain: No  nervous/anxious: No  fever: No  frequency: No  genital sores: No  headaches: No  hearing loss: No  heartburn: No  arthralgias: No  joint swelling: No  peripheral edema: No  mood changes: No  myalgias: No  nausea: No  dysuria: No  palpitations: No  Skin sensation changes: No  urgency: No  rash: No  shortness of breath: No  visual disturbance: No  weakness: No  pelvic pain: No  vaginal bleeding: No  vaginal discharge: No  tenderness: No  breast mass: No  breast discharge: No  In general, how would you rate your overall mental or emotional health?: good  PHQ-2 Score: 0  Duration of exercise:: 30-45 minutes    Respiratory  She denies having shortness of breath.    Hypertension  Patient notes that her blood pressure has been higher recently. She is tolerating her medication. She denies having chest pain.    GI  Patient experiences heartburn when she consumes chocolate and red wine. She takes Zantac periodically. She denies having bowel " issues.    Musculoskeletal  Patient denies having calf tenderness.    Neuro  Patient experiences some fatigue.    Skin  Patient had a body scan recently where she had a few skin spots removed.     Medication  She is taking 5mg of Amlodipine.     Immunizations  Patient is interested in the shingles vaccine.     Vision Screening  She gets regular eye exams. She recalls having a cataract procedure.    Breast Cancer Screening  Patient is due next year for her Mammogram as she gets one each every 2 years. Her last Mammogram was on 08-.    Mental Health:    In general, how would you rate your overall mental or emotional health? good  PHQ-2 Score: (P) 0    Additional concerns to address?  YES, BP medications.     Fall risk:      Fallen 2 or more times in the past year?: No  Any fall with injury in the past year?: No      COGNITIVE SCREEN  1) Repeat 3 items (Leader, Season, Table)    2) Clock draw: NORMAL  3) 3 item recall: Recalls 1 object   Results: NORMAL clock, 1-2 items recalled: COGNITIVE IMPAIRMENT LESS LIKELY    Mini-CogTM Copyright S Maria Dolores. Licensed by the author for use in [facility]; reprinted with permission (mamadou@[email or web address]). All rights reserved.     Reviewed and updated as needed this visit by clinical staff  Tobacco  Allergies  Meds  Med Hx  Surg Hx  Fam Hx  Soc Hx        Reviewed and updated as needed this visit by Provider        Social History   Substance Use Topics     Smoking status: Former Smoker     Packs/day: 1.00     Years: 10.00     Types: Cigarettes     Quit date: 11/14/1982     Smokeless tobacco: Never Used      Comment: quit 30+ years ago     Alcohol use Yes      Comment: social                             Do you feel safe in your environment - Yes    Do you have a Health Care Directive?: Not discussed    Current providers sharing in care for this patient include:   Patient Care Team:  Raul Hobson MD as PCP - General (Family Practice)  IRINA Mendoza MD as  MD (Cardiology)  Mingo Otero, RN as Nurse Coordinator (Cardiology)  Nathaniel Castro MD as MD (Orthopedics)    The following health maintenance items are reviewed in Epic and correct as of today:  Health Maintenance   Topic Date Due     DEPRESSION ACTION PLAN Q1 YR  11/14/1963     TETANUS IMMUNIZATION (SYSTEM ASSIGNED)  01/22/2018     ADVANCE DIRECTIVE PLANNING Q5 YRS  08/02/2018     INFLUENZA VACCINE (1) 09/01/2018     FALL RISK ASSESSMENT  10/09/2018     PHQ-9 Q6 MONTHS  05/12/2019     MAMMO SCREEN Q2 YR (SYSTEM ASSIGNED)  08/15/2019     COLON CANCER SCREEN (SYSTEM ASSIGNED)  02/02/2022     LIPID SCREEN Q5 YR FEMALE (SYSTEM ASSIGNED)  11/12/2023     DEXA SCAN SCREENING (SYSTEM ASSIGNED)  Completed     PNEUMOCOCCAL  Completed     Labs reviewed in EPIC  BP Readings from Last 3 Encounters:   11/12/18 140/76   10/09/17 (!) 143/96   04/18/17 152/90    Wt Readings from Last 3 Encounters:   11/12/18 162 lb 11.2 oz (73.8 kg)   08/30/18 165 lb 3.2 oz (74.9 kg)   03/15/18 164 lb 11.2 oz (74.7 kg)                  Patient Active Problem List   Diagnosis     Hypertension goal BP (blood pressure) < 140/90     Advanced directives, counseling/discussion     Osteoarthritis of right hip     Colon polyp     Seasonal affective disorder (H)     CARDIOVASCULAR SCREENING; LDL GOAL LESS THAN 130     Osteoarthritis of hip     Lumbar radicular pain     Hypercholesteremia     Past Surgical History:   Procedure Laterality Date     ARTHROPLASTY HIP  4/25/2012    Procedure:ARTHROPLASTY HIP; Left Total Hip Arthroplasty; Surgeon:NATHANIEL CASTRO; Location: OR     ARTHROPLASTY HIP Right 10/27/2014    Procedure: ARTHROPLASTY HIP;  Surgeon: Nathaniel Castro MD;  Location:  OR      PELVIS/HIP JOINT SURGERY UNLISTED  2012 & 2014     C STOMACH SURGERY PROCEDURE UNLISTED  1991    Perferated ulcer     COLONOSCOPY  02/12    repeat 5 yrs     EXTRACAPSULAR CATARACT EXTRATION WITH INTRAOCULAR LENS IMPLANT  12/23/09     OPEN REDUCTION INTERNAL  FIXATION ANKLE      right     OPEN REDUCTION INTERNAL FIXATION WRIST      left     VAGOTOMY, PYLOROPLASTY, COMBINED         Social History   Substance Use Topics     Smoking status: Former Smoker     Packs/day: 1.00     Years: 10.00     Types: Cigarettes     Quit date: 1982     Smokeless tobacco: Never Used      Comment: quit 30+ years ago     Alcohol use Yes      Comment: social     Family History   Problem Relation Age of Onset     HEART DISEASE Brother      Coronary Artery Disease Brother      possibly agent orange related     Hypertension Mother        of srtoke     Cerebrovascular Disease Mother      , , , , , , ,      Prostate Cancer Brother      Coronary Artery Disease Brother      possibly agent orange related     Prostate Cancer Brother      Substance Abuse No family hx of          Current Outpatient Prescriptions   Medication Sig Dispense Refill     acetaminophen (TYLENOL) 325 MG tablet Take 2 tablets (650 mg) by mouth every 6 hours as needed for mild pain 100 tablet 0     amLODIPine (NORVASC) 5 MG tablet Take 1 tablet (5 mg) by mouth daily 90 tablet 3     amoxicillin (AMOXIL) 500 MG tablet Take 4 tabs by mouth 1 hour prior to dental procedure. 4 tablet 4     aspirin 81 MG tablet Take by mouth daily       atorvastatin (LIPITOR) 10 MG tablet Take 1 tablet (10 mg) by mouth daily 90 tablet 3     CALCIUM 1500 MG OR TABS 1 po daily       GARLIC 1000 MG OR CAPS None Entered       GLUCOSAMINE-CHONDROITIN DS PO Take by mouth daily       labetalol (NORMODYNE) 200 MG tablet Take 1 tablet (200 mg) by mouth 2 times daily 180 tablet 3     losartan (COZAAR) 100 MG tablet Take 1 tablet (100 mg) by mouth daily 90 tablet 3     MULTI-DAY VITAMINS OR TABS 1 TABLET DAILY       VITAMIN E        Allergies   Allergen Reactions     Hctz Dermatitis     Vasotec Cough       Last 3 Pap and HPV Results:      ROS:  Denies chest pain, shortness of breath, bowel issues.  "Remainder of ROS is negative unless otherwise noted above or in HPI.    This document serves as a record of the services and decisions personally performed and made by Raul Hobson MD. It was created on his behalf by Marjan Robins and Toby Osorio, trained medical scribes. The creation of this document is based on the provider's statements to the medical scribe.  Marjan Osorio 1:09 PM November 12, 2018    OBJECTIVE:   /76  Pulse 70  Temp 97.5  F (36.4  C) (Oral)  Wt 162 lb 11.2 oz (73.8 kg)  SpO2 98%  BMI 26.46 kg/m2 Estimated body mass index is 26.46 kg/(m^2) as calculated from the following:    Height as of 8/30/18: 5' 5.75\" (1.67 m).    Weight as of this encounter: 162 lb 11.2 oz (73.8 kg).  EXAM:   GENERAL APPEARANCE: healthy, alert and no distress  EYES: Eyes grossly normal to inspection, PERRL and conjunctivae and sclerae normal  HENT: ear canals and TM's with cerumen, nose and mouth without ulcers or lesions, oropharynx clear and oral mucous membranes moist  NECK: no adenopathy, no asymmetry, masses, or scars and thyroid normal to palpation  RESP: lungs clear to auscultation - no rales, rhonchi or wheezes  CV: regular rate and rhythm, normal S1 S2, no S3 or S4, no murmur, click or rub, no peripheral edema and peripheral pulses strong, no bruits  ABDOMEN: soft, nontender, no hepatosplenomegaly, no masses and bowel sounds normal  MS: no musculoskeletal defects are noted and gait is age appropriate without ataxia  SKIN: no suspicious lesions or rashes  NEURO: Normal strength and tone, sensory exam grossly normal, mentation intact and speech normal, reflexes normal, no tremor  PSYCH: mentation appears normal and affect normal/bright    Diagnostic Test Results:  No results found for this or any previous visit (from the past 24 hour(s)).    ASSESSMENT / PLAN:   (Z00.00) Medicare annual wellness visit, subsequent  (primary encounter diagnosis)  Comment: Patient is doing well. " Routine physical and lab work completed.  Plan: Will notify with results. Follow up as needed.     (I10) Hypertension goal BP (blood pressure) < 140/90  Comment: Not at goal.  Plan: atorvastatin (LIPITOR) 10 MG tablet, labetalol         (NORMODYNE) 200 MG tablet, losartan (COZAAR)         100 MG tablet amLODIPine (NORVASC) 5 MG tablet               Continue taking medication. Follow up as needed.    (F33.8) Seasonal affective disorder (H)  Comment: Stable.  Plan: Monitoring.    (E78.00) Hypercholesteremia  Comment: Controlled by medication.  Plan: atorvastatin (LIPITOR) 10 MG tablet,         Comprehensive metabolic panel, Lipid panel         reflex to direct LDL Fasting        Continue taking medication. Follow up as needed.        Patient Instructions       Preventive Health Recommendations    See your health care provider every year to    Review health changes.     Discuss preventive care.      Review your medicines if your doctor has prescribed any.      You no longer need a yearly Pap test unless you've had an abnormal Pap test in the past 10 years. If you have vaginal symptoms, such as bleeding or discharge, be sure to talk with your provider about a Pap test.      Every 1 to 2 years, have a mammogram.  If you are over 69, talk with your health care provider about whether or not you want to continue having screening mammograms.      Every 10 years, have a colonoscopy. Or, have a yearly FIT test (stool test). These exams will check for colon cancer.       Have a cholesterol test every 5 years, or more often if your doctor advises it.       Have a diabetes test (fasting glucose) every three years. If you are at risk for diabetes, you should have this test more often.       At age 65, have a bone density scan (DEXA) to check for osteoporosis (brittle bone disease).    Shots:    Get a flu shot each year.    Get a tetanus shot every 10 years.    Talk to your doctor about your pneumonia vaccines. There are now two  "you should receive - Pneumovax (PPSV 23) and Prevnar (PCV 13).    Talk to your pharmacist about the shingles vaccine.    Talk to your doctor about the hepatitis B vaccine.    Nutrition:     Eat at least 5 servings of fruits and vegetables each day.      Eat whole-grain bread, whole-wheat pasta and brown rice instead of white grains and rice.      Get adequate Calcium and Vitamin D.     Lifestyle    Exercise at least 150 minutes a week (30 minutes a day, 5 days a week). This will help you control your weight and prevent disease.      Limit alcohol to one drink per day.      No smoking.       Wear sunscreen to prevent skin cancer.       See your dentist twice a year for an exam and cleaning.      See your eye doctor every 1 to 2 years to screen for conditions such as glaucoma, macular degeneration and cataracts.    Personalized Prevention Plan  You are due for the preventive services outlined below.  Your care team is available to assist you in scheduling these services.  If you have already completed any of these items, please share that information with your care team to update in your medical record.  Health Maintenance Due   Topic Date Due     Depression Action Plan Review - yearly  11[birth year]14/1963     Tetanus Vaccine - every 10 years  01/22/2018     Depression Assessment - every 6 months  04/09/2018     Discuss Advance Directive Planning  08/02/2018     Flu Vaccine (1) 09/01/2018     FALL RISK ASSESSMENT  10/09/2018       End of Life Planning:  Patient currently has an advanced directive.     COUNSELING:  Reviewed preventive health counseling, as reflected in patient instructions       Vision screening Shingles vaccine, Breast cancer screening    BP Readings from Last 1 Encounters:   11/12/18 140/76     Estimated body mass index is 26.46 kg/(m^2) as calculated from the following:    Height as of 8/30/18: 5' 5.75\" (1.67 m).    Weight as of this encounter: 162 lb 11.2 oz (73.8 kg).      Weight management plan: Discussed " healthy diet and exercise guidelines and patient will follow up in 12 months in clinic to re-evaluate.     reports that she quit smoking about 36 years ago. Her smoking use included Cigarettes. She has a 10.00 pack-year smoking history. She has never used smokeless tobacco.      Appropriate preventive services were discussed with this patient, including applicable screening as appropriate for cardiovascular disease, diabetes, osteopenia/osteoporosis, and glaucoma.  As appropriate for age/gender, discussed screening for colorectal cancer, prostate cancer, breast cancer, and cervical cancer. Checklist reviewing preventive services available has been given to the patient.    Reviewed patients plan of care and provided an AVS. The Basic Care Plan (routine screening as documented in Health Maintenance) for Leah meets the Care Plan requirement. This Care Plan has been established and reviewed with the Patient.    The information in this document, created by the medical scribes for me, accurately reflects the services I personally performed and the decisions made by me. I have reviewed and approved this document for accuracy prior to leaving the patient care area.  November 12, 2018 3:21 PM      Raul Hobson MD  [facility] [place]

## 2018-11-12 NOTE — MR AVS SNAPSHOT
After Visit Summary   11/12/2018    Leah Peña    MRN: [id number]           Patient Information     Date Of Birth          1945        Visit Information        Provider Department      11/12/2018 1:30 PM Raul Hobson MD [facility] [place]        Today's Diagnoses     Medicare annual wellness visit, subsequent    -  1    Hypertension goal BP (blood pressure) < 140/90        Seasonal affective disorder (H)        Hypercholesteremia        Benign essential hypertension          Care Instructions      Preventive Health Recommendations    See your health care provider every year to    Review health changes.     Discuss preventive care.      Review your medicines if your doctor has prescribed any.      You no longer need a yearly Pap test unless you've had an abnormal Pap test in the past 10 years. If you have vaginal symptoms, such as bleeding or discharge, be sure to talk with your provider about a Pap test.      Every 1 to 2 years, have a mammogram.  If you are over 69, talk with your health care provider about whether or not you want to continue having screening mammograms.      Every 10 years, have a colonoscopy. Or, have a yearly FIT test (stool test). These exams will check for colon cancer.       Have a cholesterol test every 5 years, or more often if your doctor advises it.       Have a diabetes test (fasting glucose) every three years. If you are at risk for diabetes, you should have this test more often.       At age 65, have a bone density scan (DEXA) to check for osteoporosis (brittle bone disease).    Shots:    Get a flu shot each year.    Get a tetanus shot every 10 years.    Talk to your doctor about your pneumonia vaccines. There are now two you should receive - Pneumovax (PPSV 23) and Prevnar (PCV 13).    Talk to your pharmacist about the shingles vaccine.    Talk to your doctor about the hepatitis B vaccine.    Nutrition:     Eat at least 5 servings of fruits  and vegetables each day.      Eat whole-grain bread, whole-wheat pasta and brown rice instead of white grains and rice.      Get adequate Calcium and Vitamin D.     Lifestyle    Exercise at least 150 minutes a week (30 minutes a day, 5 days a week). This will help you control your weight and prevent disease.      Limit alcohol to one drink per day.      No smoking.       Wear sunscreen to prevent skin cancer.       See your dentist twice a year for an exam and cleaning.      See your eye doctor every 1 to 2 years to screen for conditions such as glaucoma, macular degeneration and cataracts.    Personalized Prevention Plan  You are due for the preventive services outlined below.  Your care team is available to assist you in scheduling these services.  If you have already completed any of these items, please share that information with your care team to update in your medical record.  Health Maintenance Due   Topic Date Due     Depression Action Plan Review - yearly  11[birth year]14/1963     Tetanus Vaccine - every 10 years  01/22/2018     Depression Assessment - every 6 months  04/09/2018     Discuss Advance Directive Planning  08/02/2018     Flu Vaccine (1) 09/01/2018     FALL RISK ASSESSMENT  10/09/2018             Follow-ups after your visit        Follow-up notes from your care team     Return in about 1 year (around 11/12/2019) for Physical Exam.      Who to contact     If you have questions or need follow up information about today's clinic visit or your schedule please contact [facility] directly at [phone].  Normal or non-critical lab and imaging results will be communicated to you by MyChart, letter or phone within 4 business days after the clinic has received the results. If you do not hear from us within 7 days, please contact the clinic through MyChart or phone. If you have a critical or abnormal lab result, we will notify you by phone as soon as possible.  Submit refill requests through  Hurray! or call your pharmacy and they will forward the refill request to us. Please allow 3 business days for your refill to be completed.          Additional Information About Your Visit        [employer]ork[employer] Information     H[employer]ray! gives you secure access to your electronic health record. If you see a primary care provider, you can also send messages to your care team and make appointments. If you have questions, please call your primary care clinic.  If you do not have a primary care provider, please call [phone] and they will assist you.        Care EveryWhere ID     This is your Care EveryWhere ID. This could be used by other organizations to access your [facility] medical records  [id number]        Your Vitals Were     Pulse Temperature Pulse Oximetry BMI (Body Mass Index)          70 97.5  F (36.4  C) (Oral) 98% 26.46 kg/m2         Blood Pressure from Last 3 Encounters:   11/12/18 140/76   10/09/17 (!) 143/96   04/18/17 152/90    Weight from Last 3 Encounters:   11/12/18 162 lb 11.2 oz (73.8 kg)   08/30/18 165 lb 3.2 oz (74.9 kg)   03/15/18 164 lb 11.2 oz (74.7 kg)              We Performed the Following     Comprehensive metabolic panel     Lipid panel reflex to direct LDL Fasting          Today's Medication Changes          These changes are accurate as of 11/12/18 11:59 PM.  If you have any questions, ask your nurse or doctor.               These medicines have changed or have updated prescriptions.        Dose/Directions    atorvastatin 10 MG tablet   Commonly known as:  LIPITOR   This may have changed:  See the new instructions.   Used for:  Hypercholesteremia, Hypertension goal BP (blood pressure) < 140/90   Changed by:  Raul Hobson MD        Dose:  10 mg   Take 1 tablet (10 mg) by mouth daily   Quantity:  90 tablet   Refills:  3            Where to get your medicines      These medications were sent to [facility] [id number] [place], [place] - [address] [facility]  [address], [place]  [address]     Phone:  [phone]     amLODIPine 5 MG tablet    atorvastatin 10 MG tablet    labetalol 200 MG tablet    losartan 100 MG tablet                Primary Care Provider Office Phone # Fax #    Raul Hobson [phone]       [address] [address]        Equal Access to Services     [facility]: Hadii aad ku hadasho Soomaali, waaxda luqadaha, qaybta kaalmada adeegyada, waxay idiin hayaan adeeg kharash laBrookaan . So [facility] [phone].    ATENCIÓN: Si habla español, tiene a ruiz disposición servicios gratuitos de asistencia lingüística. Azulsakina al [phone].    We comply with applicable federal civil rights laws and [place] laws. We do not discriminate on the basis of race, color, national origin, age, disability, sex, sexual orientation, or gender identity.            Thank you!     Thank you for choosing [facility]  for your care. Our goal is always to provide you with excellent care. Hearing back from our patients is one way we can continue to improve our services. Please take a few minutes to complete the written survey that you may receive in the mail after your visit with us. Thank you!             Your Updated Medication List - Protect others around you: Learn how to safely use, store and throw away your medicines at www.disposemymeds.org.          This list is accurate as of 11/12/18 11:59 PM.  Always use your most recent med list.                   Brand Name Dispense Instructions for use Diagnosis    acetaminophen 325 MG tablet    TYLENOL    100 tablet    Take 2 tablets (650 mg) by mouth every 6 hours as needed for mild pain    Osteoarthritis of right hip       amLODIPine 5 MG tablet    NORVASC    90 tablet    Take 1 tablet (5 mg) by mouth daily    Benign essential hypertension       amoxicillin 500 MG tablet    AMOXIL    4 tablet    Take 4 tabs by mouth 1 hour prior to dental procedure.    Prophylactic antibiotic       aspirin 81  MG tablet      Take by mouth daily        atorvastatin 10 MG tablet    LIPITOR    90 tablet    Take 1 tablet (10 mg) by mouth daily    Hypercholesteremia, Hypertension goal BP (blood pressure) < 140/90       calcium 1500 MG Tabs      1 po daily        Garlic 1000 MG Caps      None Entered        GLUCOSAMINE-CHONDROITIN DS PO      Take by mouth daily        labetalol 200 MG tablet    NORMODYNE    180 tablet    Take 1 tablet (200 mg) by mouth 2 times daily    Hypertension goal BP (blood pressure) < 140/90       losartan 100 MG tablet    COZAAR    90 tablet    Take 1 tablet (100 mg) by mouth daily    Hypertension goal BP (blood pressure) < 140/90       MULTI-DAY vitamin  S Tabs      1 TABLET DAILY        VITAMIN E

## 2018-11-13 PROBLEM — I10 ELEVATED BLOOD PRESSURE READING IN OFFICE WITH WHITE COAT SYNDROME, WITH DIAGNOSIS OF HYPERTENSION: Status: RESOLVED | Noted: 2017-10-09 | Resolved: 2018-11-13

## 2018-11-13 LAB
ALBUMIN SERPL-MCNC: 4 G/DL (ref 3.4–5)
ALP SERPL-CCNC: 84 U/L (ref 40–150)
ALT SERPL W P-5'-P-CCNC: 22 U/L (ref 0–50)
ANION GAP SERPL CALCULATED.3IONS-SCNC: 10 MMOL/L (ref 3–14)
AST SERPL W P-5'-P-CCNC: 24 U/L (ref 0–45)
BILIRUB SERPL-MCNC: 0.5 MG/DL (ref 0.2–1.3)
BUN SERPL-MCNC: 15 MG/DL (ref 7–30)
CALCIUM SERPL-MCNC: 9.5 MG/DL (ref 8.5–10.1)
CHLORIDE SERPL-SCNC: 104 MMOL/L (ref 94–109)
CHOLEST SERPL-MCNC: 179 MG/DL
CO2 SERPL-SCNC: 23 MMOL/L (ref 20–32)
CREAT SERPL-MCNC: 0.74 MG/DL (ref 0.52–1.04)
GFR SERPL CREATININE-BSD FRML MDRD: 77 ML/MIN/1.7M2
GLUCOSE SERPL-MCNC: 89 MG/DL (ref 70–99)
HDLC SERPL-MCNC: 78 MG/DL
LDLC SERPL CALC-MCNC: 79 MG/DL
NONHDLC SERPL-MCNC: 101 MG/DL
POTASSIUM SERPL-SCNC: 4.2 MMOL/L (ref 3.4–5.3)
PROT SERPL-MCNC: 7 G/DL (ref 6.8–8.8)
SODIUM SERPL-SCNC: 137 MMOL/L (ref 133–144)
TRIGL SERPL-MCNC: 108 MG/DL

## 2018-11-13 NOTE — PROGRESS NOTES
Nathanael Lynn, your recent results are back and are all normal. Please contact if any questions. Nice to see you!   Raul

## 2019-01-11 ENCOUNTER — TRANSFERRED RECORDS (OUTPATIENT)
Dept: HEALTH INFORMATION MANAGEMENT | Facility: CLINIC | Age: 74
End: 2019-01-11

## 2019-01-17 DIAGNOSIS — M25.562 LEFT KNEE PAIN: Primary | ICD-10-CM

## 2019-01-24 ENCOUNTER — ANCILLARY PROCEDURE (OUTPATIENT)
Dept: GENERAL RADIOLOGY | Facility: CLINIC | Age: 74
End: 2019-01-24
Payer: COMMERCIAL

## 2019-01-24 ENCOUNTER — OFFICE VISIT (OUTPATIENT)
Dept: ORTHOPEDICS | Facility: CLINIC | Age: 74
End: 2019-01-24
Payer: COMMERCIAL

## 2019-01-24 VITALS — WEIGHT: 160 LBS | BODY MASS INDEX: 26.02 KG/M2

## 2019-01-24 DIAGNOSIS — M25.561 CHRONIC PAIN OF RIGHT KNEE: Primary | ICD-10-CM

## 2019-01-24 DIAGNOSIS — G89.29 CHRONIC PAIN OF RIGHT KNEE: Primary | ICD-10-CM

## 2019-01-24 ASSESSMENT — KOOS JR: HOW SEVERE IS YOUR KNEE STIFFNESS AFTER FIRST WAKING IN MORNING: MODERATE

## 2019-01-24 ASSESSMENT — ACTIVITIES OF DAILY LIVING (ADL): FUNCTION,_DAILY_LIVING_SCORE: 77.94

## 2019-01-24 NOTE — PROGRESS NOTES
Chief Complaint: RECHECK and Pain of the Left Knee; RECHECK and Pain of the Right Knee; and RECHECK (requesting cortisone injections)       HPI: Leah Peña returns today in follow-up for her right knee. She continues to have anterior knee pain. She reports that she paid pretty close attention to the right knee in the months after her last injection and she reports that she did in fact benefit from the injection for a good period of time. She reports that the left knee is not so bad that she wants an injection today. She mainly wanted the radiograph to compare the two.       Medications and allergies are documented in the EMR and have been reviewed.    Current Outpatient Medications:      acetaminophen (TYLENOL) 325 MG tablet, Take 2 tablets (650 mg) by mouth every 6 hours as needed for mild pain, Disp: 100 tablet, Rfl: 0     amLODIPine (NORVASC) 5 MG tablet, Take 1 tablet (5 mg) by mouth daily, Disp: 90 tablet, Rfl: 3     amoxicillin (AMOXIL) 500 MG tablet, Take 4 tabs by mouth 1 hour prior to dental procedure., Disp: 4 tablet, Rfl: 4     aspirin 81 MG tablet, Take by mouth daily, Disp: , Rfl:      atorvastatin (LIPITOR) 10 MG tablet, Take 1 tablet (10 mg) by mouth daily, Disp: 90 tablet, Rfl: 3     CALCIUM 1500 MG OR TABS, 1 po daily, Disp: , Rfl:      GARLIC 1000 MG OR CAPS, None Entered, Disp: , Rfl:      GLUCOSAMINE-CHONDROITIN DS PO, Take by mouth daily, Disp: , Rfl:      labetalol (NORMODYNE) 200 MG tablet, Take 1 tablet (200 mg) by mouth 2 times daily, Disp: 180 tablet, Rfl: 3     losartan (COZAAR) 100 MG tablet, Take 1 tablet (100 mg) by mouth daily, Disp: 90 tablet, Rfl: 3     MULTI-DAY VITAMINS OR TABS, 1 TABLET DAILY, Disp: , Rfl:      VITAMIN E, , Disp: , Rfl:     Current Facility-Administered Medications:      lidocaine 1 % injection 5 mL, 5 mL, , , Nathaniel Epperson MD, 5 mL at 08/30/18 1849     triamcinolone acetonide (KENALOG-40) injection 40 mg, 40 mg, , , Nathaniel Epperson MD, 40 mg at  08/30/18 1849  Allergies: Hctz and Vasotec    Physical Exam:  On physical examination the patient appears the stated age, is in no acute distress, affect is appropriate, and breathing is non-labored.  Wt 72.6 kg (160 lb)   BMI 26.02 kg/m    Body mass index is 26.02 kg/m .  On examination the knee is benign in appearance. Retains full extension. Symptoms are reproduced with examination of the patella against the trochlea on the right.     X-rays:    I reviewed the x-rays dated today.  Previous films reviewed.    Findings:  Advanced lateral patello femoral OA    Assessment:  Right knee osteoarthritis being managed with appropriate benefit with intra-articular injection. Reports that she would like to consider total knee as it is becoming increasingly impactful on her ADLs. Discussed the risks and benefits of repeat steroid injection including increased risk of infection  Plan: right knee injection of steroid    Procedure Note:    After informed consent was obtained the patient's right knee was injected with 5 cc of lidocaine and 40 mg of kenolog using a superior-medial approach.  Sterile technique was used.  The patient tolerated the procedure well.    Referred Self    Answers for HPI/ROS submitted by the patient on 1/22/2019   General Symptoms: No  Skin Symptoms: No  HENT Symptoms: No  EYE SYMPTOMS: No  HEART SYMPTOMS: No  LUNG SYMPTOMS: No  INTESTINAL SYMPTOMS: No  URINARY SYMPTOMS: No  GYNECOLOGIC SYMPTOMS: No  BREAST SYMPTOMS: No  SKELETAL SYMPTOMS: No  BLOOD SYMPTOMS: No  NERVOUS SYSTEM SYMPTOMS: No  MENTAL HEALTH SYMPTOMS: No

## 2019-01-24 NOTE — NURSING NOTE
Reason For Visit:   Chief Complaint   Patient presents with     Left Knee - RECHECK, Pain     Right Knee - RECHECK, Pain     RECHECK     requesting cortisone injections       Wt 72.6 kg (160 lb)   BMI 26.02 kg/m      Pain Assessment  Patient Currently in Pain: Yes  0-10 Pain Scale: 3  Primary Pain Location: Knee(left and right)  Alleviating Factors: Rest  Aggravating Factors: Other (comment), Stairs(when exhausted )    Sravanthi Lau, ATC

## 2019-01-24 NOTE — LETTER
1/24/2019       RE: Leah Peña  [address]  [address]  [facility] [address]     Dear Colleague,    Thank you for referring your patient, Leah Peña, to the [facility]. Please see a copy of my visit note below.    Chief Complaint: RECHECK and Pain of the Left Knee; RECHECK and Pain of the Right Knee; and RECHECK (requesting cortisone injections)       HPI: Leah Peña returns today in follow-up for her right knee. She continues to have anterior knee pain. She reports that she paid pretty close attention to the right knee in the months after her last injection and she reports that she did in fact benefit from the injection for a good period of time. She reports that the left knee is not so bad that she wants an injection today. She mainly wanted the radiograph to compare the two.       Medications and allergies are documented in the EMR and have been reviewed.    Current Outpatient Medications:      acetaminophen (TYLENOL) 325 MG tablet, Take 2 tablets (650 mg) by mouth every 6 hours as needed for mild pain, Disp: 100 tablet, Rfl: 0     amLODIPine (NORVASC) 5 MG tablet, Take 1 tablet (5 mg) by mouth daily, Disp: 90 tablet, Rfl: 3     amoxicillin (AMOXIL) 500 MG tablet, Take 4 tabs by mouth 1 hour prior to dental procedure., Disp: 4 tablet, Rfl: 4     aspirin 81 MG tablet, Take by mouth daily, Disp: , Rfl:      atorvastatin (LIPITOR) 10 MG tablet, Take 1 tablet (10 mg) by mouth daily, Disp: 90 tablet, Rfl: 3     CALCIUM 1500 MG OR TABS, 1 po daily, Disp: , Rfl:      GARLIC 1000 MG OR CAPS, None Entered, Disp: , Rfl:      GLUCOSAMINE-CHONDROITIN DS PO, Take by mouth daily, Disp: , Rfl:      labetalol (NORMODYNE) 200 MG tablet, Take 1 tablet (200 mg) by mouth 2 times daily, Disp: 180 tablet, Rfl: 3     losartan (COZAAR) 100 MG tablet, Take 1 tablet (100 mg) by mouth daily, Disp: 90 tablet, Rfl: 3     MULTI-DAY VITAMINS OR TABS, 1  TABLET DAILY, Disp: , Rfl:      VITAMIN E, , Disp: , Rfl:     Current Facility-Administered Medications:      lidocaine 1 % injection 5 mL, 5 mL, , , Nathaniel Epperson MD, 5 mL at 08/30/18 1849     triamcinolone acetonide (KENALOG-40) injection 40 mg, 40 mg, , , Nathaniel Epperson MD, 40 mg at 08/30/18 1849  Allergies: Hctz and Vasotec    Physical Exam:  On physical examination the patient appears the stated age, is in no acute distress, affect is appropriate, and breathing is non-labored.  Wt 72.6 kg (160 lb)   BMI 26.02 kg/m     Body mass index is 26.02 kg/m .  On examination the knee is benign in appearance. Retains full extension. Symptoms are reproduced with examination of the patella against the trochlea on the right.     X-rays:    I reviewed the x-rays dated today.  Previous films reviewed.    Findings:  Advanced lateral patello femoral OA    Assessment:  Right knee osteoarthritis being managed with appropriate benefit with intra-articular injection. Reports that she would like to consider total knee as it is becoming increasingly impactful on her ADLs. Discussed the risks and benefits of repeat steroid injection including increased risk of infection  Plan: right knee injection of steroid    Procedure Note:    After informed consent was obtained the patient's right knee was injected with 5 cc of lidocaine and 40 mg of kenolog using a superior-medial approach.  Sterile technique was used.  The patient tolerated the procedure well.    Referred Self    Again, thank you for allowing me to participate in the care of your patient.      Sincerely,    Nathaniel Epperson MD

## 2019-01-24 NOTE — NURSING NOTE
The following medication was given:     MEDICATION:  Xylocaine-MPF  ROUTE: Intraarticular   SITE: right knee   DOSE: 5cc  LOT #: [id number]  : [employer]  EXPIRATION DATE: 06/22  NDC#: 74488-084-28   Was there drug waste? Yes  Amount of drug waste (mL): 25.  Reason for waste:  Single use vial  Multi-dose vial: No        The following medication was given:     MEDICATION: Tramcinolone Acetonide (kenalog)  ROUTE: intraarticular   SITE: right knee  DOSE: 40 mg  LOT #: [id number]  : [employer]  EXPIRATION DATE: 09/2020  NDC#: [address]-0   Was there drug waste? No  Multi-dose vial: No        Sravanthi Lau, [facility]  January 24, 2019

## 2019-09-28 ENCOUNTER — HEALTH MAINTENANCE LETTER (OUTPATIENT)
Age: 74
End: 2019-09-28

## 2019-10-16 ENCOUNTER — ANCILLARY PROCEDURE (OUTPATIENT)
Dept: MAMMOGRAPHY | Facility: CLINIC | Age: 74
End: 2019-10-16
Attending: FAMILY MEDICINE
Payer: COMMERCIAL

## 2019-10-16 DIAGNOSIS — Z12.31 VISIT FOR SCREENING MAMMOGRAM: ICD-10-CM

## 2019-11-24 DIAGNOSIS — I10 BENIGN ESSENTIAL HYPERTENSION: ICD-10-CM

## 2019-11-25 RX ORDER — AMLODIPINE BESYLATE 5 MG/1
TABLET ORAL
Qty: 90 TABLET | Refills: 3 | OUTPATIENT
Start: 2019-11-25

## 2019-11-25 RX ORDER — AMLODIPINE BESYLATE 5 MG/1
5 TABLET ORAL DAILY
Qty: 90 TABLET | Refills: 0 | Status: SHIPPED | OUTPATIENT
Start: 2019-11-25 | End: 2020-01-08

## 2019-11-25 NOTE — TELEPHONE ENCOUNTER
"Requested Prescriptions   Pending Prescriptions Disp Refills     amLODIPine (NORVASC) 5 MG tablet [Pharmacy Med Name: AMLODIPINE BESYLATE 5 MG TAB] 90 tablet 3     Sig: TAKE 1 TABLET BY MOUTH EVERY DAY  Last Written Prescription Date:  11/12/2018  Last Fill Quantity: 90,  # refills: 3   Last office visit: 11/12/2018 with prescribing provider:  11/12/2018   Future Office Visit:         Calcium Channel Blockers Protocol  Failed - 11/24/2019 12:58 AM        Failed - Blood pressure under 140/90 in past 12 months     BP Readings from Last 3 Encounters:   11/12/18 140/76   10/09/17 (!) 143/96   04/18/17 152/90                 Failed - Recent (12 mo) or future (30 days) visit within the authorizing provider's specialty     Patient has had an office visit with the authorizing provider or a provider within the authorizing providers department within the previous 12 mos or has a future within next 30 days. See \"Patient Info\" tab in inbasket, or \"Choose Columns\" in Meds & Orders section of the refill encounter.              Failed - Normal serum creatinine on file in past 12 months     Recent Labs   Lab Test 11/12/18  1403   CR 0.74             Passed - Medication is active on med list        Passed - Patient is age 18 or older        Passed - No active pregnancy on record        Passed - No positive pregnancy test in past 12 months        "

## 2019-11-25 NOTE — TELEPHONE ENCOUNTER
Called and left VMM. Patient need annual visit as well as labs per medication protocol. Wrote [facility] message asking for patient to make appointment for follow-up. Dejah Reich RN on 11/25/2019 at 11:14 AM

## 2019-11-25 NOTE — TELEPHONE ENCOUNTER
Prescription approved per [facility] Refill Protocol.    Pt made appointment  Refill sent  Pt had documented BP readings on 4/11/19     Marissa Watkins RN   [facility]

## 2019-12-17 DIAGNOSIS — E78.00 HYPERCHOLESTEREMIA: ICD-10-CM

## 2019-12-17 DIAGNOSIS — I10 HYPERTENSION GOAL BP (BLOOD PRESSURE) < 140/90: ICD-10-CM

## 2019-12-17 RX ORDER — ATORVASTATIN CALCIUM 10 MG/1
TABLET, FILM COATED ORAL
Qty: 90 TABLET | Refills: 3 | Status: SHIPPED | OUTPATIENT
Start: 2019-12-17 | End: 2020-12-16

## 2019-12-17 NOTE — TELEPHONE ENCOUNTER
"Requested Prescriptions   Pending Prescriptions Disp Refills     atorvastatin (LIPITOR) 10 MG tablet [Pharmacy Med Name: ATORVASTATIN 10 MG TABLET] 90 tablet 3     Sig: TAKE 1 TABLET BY MOUTH EVERY DAY  Last Written Prescription Date:  11/12/2018  Last Fill Quantity: 90,  # refills: 3   Last office visit: 11/12/2018 with prescribing provider:  11/12/2018   Future Office Visit:   Next 5 appointments (look out 90 days)    Jan 08, 2020  1:00 PM CST  PHYSICAL with Raul Hobson MD  [facility] [place] [place] ([address] [address] [phone]              Statins Protocol Failed - 12/17/2019  5:21 AM        Failed - LDL on file in past 12 months     Recent Labs   Lab Test 11/12/18  1403   LDL 79             Passed - No abnormal creatine kinase in past 12 months     No lab results found.             Passed - Recent (12 mo) or future (30 days) visit within the authorizing provider's specialty     Patient has had an office visit with the authorizing provider or a provider within the authorizing providers department within the previous 12 mos or has a future within next 30 days. See \"Patient Info\" tab in inbasket, or \"Choose Columns\" in Meds & Orders section of the refill encounter.              Passed - Medication is active on med list        Passed - Patient is age 18 or older        Passed - No active pregnancy on record        Passed - No positive pregnancy test in past 12 months        "

## 2019-12-17 NOTE — TELEPHONE ENCOUNTER
Has upcoming appointment with fasting labs already scheduled. Prescription approved per [facility] Refill Protocol.  Dejah Reich RN on 12/17/2019 at 10:41 AM

## 2019-12-23 DIAGNOSIS — I10 HYPERTENSION GOAL BP (BLOOD PRESSURE) < 140/90: ICD-10-CM

## 2019-12-23 RX ORDER — LOSARTAN POTASSIUM 100 MG/1
TABLET ORAL
Qty: 30 TABLET | Refills: 0 | Status: SHIPPED | OUTPATIENT
Start: 2019-12-23 | End: 2020-01-08

## 2019-12-23 NOTE — TELEPHONE ENCOUNTER
"Requested Prescriptions   Pending Prescriptions Disp Refills     losartan (COZAAR) 100 MG tablet [Pharmacy Med Name: LOSARTAN POTASSIUM 100 MG TAB] 90 tablet 3     Sig: TAKE 1 TABLET BY MOUTH EVERY DAY   Last Written Prescription Date:  11/12/18  Last Fill Quantity: 90,  # refills: 3   Last office visit: 11/12/2018 with prescribing provider:  Dr. Hobson   Future Office Visit:   Next 5 appointments (look out 90 days)    Jan 08, 2020  1:00 PM CST  PHYSICAL with Raul Hobson MD  [facility] [place] ([address] [address] [phone]             Angiotensin-II Receptors Failed - 12/23/2019  2:58 AM        Failed - Last blood pressure under 140/90 in past 12 months     BP Readings from Last 3 Encounters:   11/12/18 140/76   10/09/17 (!) 143/96   04/18/17 152/90                 Failed - Normal serum creatinine on file in past 12 months     Recent Labs   Lab Test 11/12/18  1403   CR 0.74             Failed - Normal serum potassium on file in past 12 months     Recent Labs   Lab Test 11/12/18  1403   POTASSIUM 4.2                    Passed - Recent (12 mo) or future (30 days) visit within the authorizing provider's specialty     Patient has had an office visit with the authorizing provider or a provider within the authorizing providers department within the previous 12 mos or has a future within next 30 days. See \"Patient Info\" tab in inbasket, or \"Choose Columns\" in Meds & Orders section of the refill encounter.              Passed - Medication is active on med list        Passed - Patient is age 18 or older        Passed - No active pregnancy on record        Passed - No positive pregnancy test in past 12 months          "

## 2019-12-23 NOTE — TELEPHONE ENCOUNTER
Medication refills given. Patient has upcoming appointment.   Dejah Reich RN on 12/23/2019 at 12:31 PM

## 2020-01-05 ASSESSMENT — ENCOUNTER SYMPTOMS
NERVOUS/ANXIOUS: 0
EYE PAIN: 0
WEAKNESS: 0
HEMATURIA: 0
DIARRHEA: 0
DIZZINESS: 0
PALPITATIONS: 0
HEARTBURN: 0
PARESTHESIAS: 0
JOINT SWELLING: 0
HEADACHES: 0
FEVER: 0
SORE THROAT: 0
ABDOMINAL PAIN: 0
DYSURIA: 0
SHORTNESS OF BREATH: 0
CONSTIPATION: 0
CHILLS: 0
MYALGIAS: 0
COUGH: 0
FREQUENCY: 0
HEMATOCHEZIA: 0
BREAST MASS: 0
ARTHRALGIAS: 1
NAUSEA: 0

## 2020-01-05 ASSESSMENT — ACTIVITIES OF DAILY LIVING (ADL): CURRENT_FUNCTION: NO ASSISTANCE NEEDED

## 2020-01-07 ASSESSMENT — ENCOUNTER SYMPTOMS
NERVOUS/ANXIOUS: 0
CHILLS: 0
WEAKNESS: 0
HEMATURIA: 0
EYE PAIN: 0
COUGH: 0
NAUSEA: 0
DYSURIA: 0
PARESTHESIAS: 0
FREQUENCY: 0
HEMATOCHEZIA: 0
JOINT SWELLING: 0
SORE THROAT: 0
BREAST MASS: 0
ABDOMINAL PAIN: 0
DIZZINESS: 0
FEVER: 0
PALPITATIONS: 0
HEARTBURN: 0
HEADACHES: 0
ARTHRALGIAS: 1
MYALGIAS: 0
CONSTIPATION: 0
SHORTNESS OF BREATH: 0
DIARRHEA: 0

## 2020-01-07 ASSESSMENT — ACTIVITIES OF DAILY LIVING (ADL): CURRENT_FUNCTION: NO ASSISTANCE NEEDED

## 2020-01-07 NOTE — PATIENT INSTRUCTIONS
Patient Education   Personalized Prevention Plan  You are due for the preventive services outlined below.  Your care team is available to assist you in scheduling these services.  If you have already completed any of these items, please share that information with your care team to update in your medical record.  Health Maintenance Due   Topic Date Due     Zoster (Shingles) Vaccine (2 of 3) 11/26/2012     Diptheria Tetanus Pertussis (DTAP/TDAP/TD) Vaccine (2 - Td) 01/22/2018     Discuss Advance Care Planning  08/02/2018     Annual Wellness Visit  11/12/2019     FALL RISK ASSESSMENT  11/12/2019

## 2020-01-07 NOTE — PROGRESS NOTES
"SUBJECTIVE:   Leah Peña is a 74 year old female who presents for Preventive Visit.  Are you in the first 12 months of your Medicare coverage?  No    Healthy Habits:     In general, how would you rate your overall health?  Good    Frequency of exercise:  4-5 days/week    Duration of exercise:  Greater than 60 minutes    Do you usually eat at least 4 servings of fruit and vegetables a day, include whole grains    & fiber and avoid regularly eating high fat or \"junk\" foods?  Yes    Taking medications regularly:  Yes    Medication side effects:  None    Ability to successfully perform activities of daily living:  No assistance needed    Home Safety:  No safety concerns identified    Hearing Impairment:  No hearing concerns    In the past 6 months, have you been bothered by leaking of urine? Yes    In general, how would you rate your overall mental or emotional health?  Good      PHQ-2 Total Score: 0    Blood Pressure Management:   Patient's at-home blood pressure ranges over a 4 month period: 111-142 systolic, 66-76 diastolic.     Breast Cancer Screening:  Patient has elected to continue mammograms and last had one on 10/16/19    Do you feel safe in your environment? Yes    Have you ever done Advance Care Planning? (For example, a Health Directive, POLST, or a discussion with a medical provider or your loved ones about your wishes): Yes, advance care planning is on file.      Fall risk  Fallen 2 or more times in the past year?: No  Any fall with injury in the past year?: No  click delete button to remove this line now  Cognitive Screening   1) Repeat 3 items (Leader, Season, Table)      2) Clock draw:   NORMAL  3) 3 item recall:   Recalls 1 object   Results: NORMAL clock, 1-2 items recalled: COGNITIVE IMPAIRMENT LESS LIKELY    Mini-CogTM Copyright S Maria Dolores. Licensed by the author for use in [facility]; reprinted with permission (mamadou@.[facility]). All rights reserved.      Do you have sleep apnea, " excessive snoring or daytime drowsiness?: no    Reviewed and updated as needed this visit by clinical staff  Tobacco  Allergies  Meds  Problems  Med Hx  Surg Hx  Fam Hx  Soc Hx         Reviewed and updated as needed this visit by Provider  Problems        Social History     Tobacco Use     Smoking status: Former Smoker     Packs/day: 1.00     Years: 10.00     Pack years: 10.00     Types: Cigarettes     Last attempt to quit: 1982     Years since quittin.1     Smokeless tobacco: Never Used     Tobacco comment: quit 30+ years ago   Substance Use Topics     Alcohol use: Yes     Comment: social     If you drink alcohol do you typically have >3 drinks per day or >7 drinks per week? No    No flowsheet data found.    Current providers sharing in care for this patient include:   Patient Care Team:  Raul Hobson MD as PCP - General (Family Practice)  Raul Hobson MD as Assigned PCP  IRINA Mendoza MD as MD (Cardiology)  Nathaniel Epperson MD as MD (Orthopedics)    The following health maintenance items are reviewed in Epic and correct as of today:  Health Maintenance   Topic Date Due     ZOSTER IMMUNIZATION (2 of 3) 2012     DTAP/TDAP/TD IMMUNIZATION (2 - Td) 2018     MEDICARE ANNUAL WELLNESS VISIT  2019     FALL RISK ASSESSMENT  2019     COLONOSCOPY  2022     ADVANCE CARE PLANNING  2025     DEXA  Completed     PHQ-2  Completed     INFLUENZA VACCINE  Completed     PNEUMOCOCCAL IMMUNIZATION 65+ LOW/MEDIUM RISK  Completed     IPV IMMUNIZATION  Aged Out     MENINGITIS IMMUNIZATION  Aged Out     Lab work is in process  Labs reviewed in EPIC  BP Readings from Last 3 Encounters:   20 136/80   18 140/76   10/09/17 (!) 143/96    Wt Readings from Last 3 Encounters:   20 71.7 kg (158 lb 1.6 oz)   19 72.6 kg (160 lb)   18 73.8 kg (162 lb 11.2 oz)                  Patient Active Problem List   Diagnosis     Hypertension goal BP  (blood pressure) < 140/90     Advanced directives, counseling/discussion     Osteoarthritis of right hip     Colon polyp     Seasonal affective disorder (H)     CARDIOVASCULAR SCREENING; LDL GOAL LESS THAN 130     Osteoarthritis of hip     Lumbar radicular pain     Hypercholesteremia     Past Surgical History:   Procedure Laterality Date     ARTHROPLASTY HIP  2012    Procedure:ARTHROPLASTY HIP; Left Total Hip Arthroplasty; Surgeon:NATHANIEL CASTRO; Location:[facility]     ARTHROPLASTY HIP Right 10/27/2014    Procedure: ARTHROPLASTY HIP;  Surgeon: Nathaniel Castro MD;  Location: [facility]     C PELVIS/HIP JOINT SURGERY UNLISTED   &      C STOMACH SURGERY PROCEDURE UNLISTED      Perferated ulcer     COLONOSCOPY      repeat 5 yrs     EXTRACAPSULAR CATARACT EXTRATION WITH INTRAOCULAR LENS IMPLANT  09     OPEN REDUCTION INTERNAL FIXATION ANKLE      right     OPEN REDUCTION INTERNAL FIXATION WRIST      left     VAGOTOMY, PYLOROPLASTY, COMBINED         Social History     Tobacco Use     Smoking status: Former Smoker     Packs/day: 1.00     Years: 10.00     Pack years: 10.00     Types: Cigarettes     Last attempt to quit: 1982     Years since quittin.1     Smokeless tobacco: Never Used     Tobacco comment: quit 30+ years ago   Substance Use Topics     Alcohol use: Yes     Comment: social     Family History   Problem Relation Age of Onset     Heart Disease Brother      Coronary Artery Disease Brother         possibly agent orange related     Hypertension Mother           of srtoke     Cerebrovascular Disease Mother         , , , , , , ,      Prostate Cancer Brother      Coronary Artery Disease Brother         possibly agent orange related     Prostate Cancer Brother      Substance Abuse No family hx of          Current Outpatient Medications   Medication Sig Dispense Refill     acetaminophen (TYLENOL) 325 MG tablet Take 2 tablets (650 mg)  by mouth every 6 hours as needed for mild pain 100 tablet 0     amLODIPine (NORVASC) 5 MG tablet Take 1 tablet (5 mg) by mouth daily 90 tablet 3     amoxicillin (AMOXIL) 500 MG tablet Take 4 tabs by mouth 1 hour prior to dental procedure. 4 tablet 4     aspirin 81 MG tablet Take by mouth daily       atorvastatin (LIPITOR) 10 MG tablet TAKE 1 TABLET BY MOUTH EVERY DAY 90 tablet 3     CALCIUM 1500 MG OR TABS 1 po daily       GLUCOSAMINE-CHONDROITIN DS PO Take by mouth daily       labetalol (NORMODYNE) 200 MG tablet Take 1 tablet (200 mg) by mouth 2 times daily 180 tablet 3     losartan (COZAAR) 100 MG tablet Take 1 tablet (100 mg) by mouth daily 90 tablet 3     MULTI-DAY VITAMINS OR TABS 1 TABLET DAILY       VITAMIN E        GARLIC 1000 MG OR CAPS None Entered       Allergies   Allergen Reactions     Hctz Dermatitis     Vasotec Cough     Mammogram Screening: Patient over age 75, has elected to continue with mammography screening.    Review of Systems   Constitutional: Negative for chills and fever.   HENT: Negative for congestion, ear pain, hearing loss and sore throat.    Eyes: Negative for pain and visual disturbance.   Respiratory: Negative for cough and shortness of breath.    Cardiovascular: Negative for chest pain, palpitations and peripheral edema.   Gastrointestinal: Negative for abdominal pain, constipation, diarrhea, heartburn, hematochezia and nausea.   Breasts:  Negative for tenderness, breast mass and discharge.   Genitourinary: Positive for urgency. Negative for dysuria, frequency, genital sores, hematuria, pelvic pain, vaginal bleeding and vaginal discharge.   Musculoskeletal: Positive for arthralgias. Negative for joint swelling and myalgias.   Skin: Negative for rash.   Neurological: Negative for dizziness, weakness, headaches and paresthesias.   Psychiatric/Behavioral: Negative for mood changes. The patient is not nervous/anxious.      This document serves as a record of the services and decisions  "personally performed and made by Raul Hobson MD. It was created on his behalf by Toby Maravilla, trained medical scribes. The creation of this document is based on the provider's statements to the medical scribes.  Toby Maravilla 8:16 AM January 8, 2020    OBJECTIVE:   /80   Pulse 78   Temp 97.4  F (36.3  C) (Oral)   Ht 1.645 m (5' 4.76\")   Wt 71.7 kg (158 lb 1.6 oz)   LMP  (LMP Unknown)   SpO2 96%   Breastfeeding No   BMI 26.50 kg/m   Estimated body mass index is 26.5 kg/m  as calculated from the following:    Height as of this encounter: 1.645 m (5' 4.76\").    Weight as of this encounter: 71.7 kg (158 lb 1.6 oz).  Physical Exam  GENERAL APPEARANCE: healthy, alert and no distress  EYES: Eyes grossly normal to inspection, PERRL and conjunctivae and sclerae normal  HENT: ear canals and TM's normal, nose and mouth without ulcers or lesions, oropharynx clear and oral mucous membranes moist  NECK: no adenopathy, no asymmetry, masses, or scars and thyroid normal to palpation  RESP: lungs clear to auscultation - no rales, rhonchi or wheezes  CV: regular rate and rhythm, normal S1 S2, no S3 or S4, no murmur, click or rub, no peripheral edema and peripheral pulses strong  ABDOMEN: soft, nontender, no hepatosplenomegaly, no masses and bowel sounds normal  MS: no musculoskeletal defects are noted and gait is age appropriate without ataxia  SKIN: no suspicious lesions or rashes  NEURO: Normal strength and tone, sensory exam grossly normal, mentation intact and speech normal  PSYCH: mentation appears normal and affect normal/bright    Diagnostic Test Results:  Labs reviewed in Epic  No results found for this or any previous visit (from the past 24 hour(s)).    ASSESSMENT / PLAN:   (Z00.00) Encounter for Medicare annual wellness exam  (primary encounter diagnosis)  Comment: Patient is doing well. Routine physical and lab work completed.  Plan: Follow up as needed.    (I10) " "Hypertension goal BP (blood pressure) < 140/90  Comment: <140/90 at goal.  Plan: labetalol (NORMODYNE) 200 MG tablet, losartan         (COZAAR) 100 MG tablet, Comprehensive metabolic        panel, Lipid panel reflex to direct LDL Fasting       Continue taking medication. Follow up as needed.    (E78.00) Hypercholesteremia   Comment: Pending lab work.  Plan: Will notify with results. Follow up as needed.    (Z23) Need for DTaP vaccine  Comment: Pt agrees to vaccine  Plan: TDAP, IM (10 - 64 YRS) - Adacel       Follow up as needed.    Patient Instructions       Patient Education   Personalized Prevention Plan  You are due for the preventive services outlined below.  Your care team is available to assist you in scheduling these services.  If you have already completed any of these items, please share that information with your care team to update in your medical record.  Health Maintenance Due   Topic Date Due     Zoster (Shingles) Vaccine (2 of 3) 11/26/2012     Diptheria Tetanus Pertussis (DTAP/TDAP/TD) Vaccine (2 - Td) 01/22/2018     Discuss Advance Care Planning  08/02/2018     Annual Wellness Visit  11/12/2019     FALL RISK ASSESSMENT  11/12/2019          COUNSELING:  Reviewed preventive health counseling, as reflected in patient instructions       Vision screening,  Immunizations: Advised Shingrix, Tetanus vaccine    Estimated body mass index is 26.5 kg/m  as calculated from the following:    Height as of this encounter: 1.645 m (5' 4.76\").    Weight as of this encounter: 71.7 kg (158 lb 1.6 oz).         reports that she quit smoking about 37 years ago. Her smoking use included cigarettes. She has a 10.00 pack-year smoking history. She has never used smokeless tobacco.      Appropriate preventive services were discussed with this patient, including applicable screening as appropriate for cardiovascular disease, diabetes, osteopenia/osteoporosis, and glaucoma.  As appropriate for age/gender, discussed screening for " colorectal cancer, prostate cancer, breast cancer, and cervical cancer. Checklist reviewing preventive services available has been given to the patient.    Reviewed patients plan of care and provided an AVS. The Basic Care Plan (routine screening as documented in Health Maintenance) for Leah meets the Care Plan requirement. This Care Plan has been established and reviewed with the Patient.    The information in this document, created by the medical scribe for me, accurately reflects the services I personally performed and the decisions made by me. I have reviewed and approved this document for accuracy prior to leaving the patient care area.  January 8, 2020 8:18 AM    Raul Hobson MD  [facility]

## 2020-01-08 ENCOUNTER — OFFICE VISIT (OUTPATIENT)
Dept: FAMILY MEDICINE | Facility: CLINIC | Age: 75
End: 2020-01-08
Payer: COMMERCIAL

## 2020-01-08 VITALS
WEIGHT: 158.1 LBS | DIASTOLIC BLOOD PRESSURE: 80 MMHG | HEART RATE: 78 BPM | OXYGEN SATURATION: 96 % | HEIGHT: 65 IN | BODY MASS INDEX: 26.34 KG/M2 | SYSTOLIC BLOOD PRESSURE: 136 MMHG | TEMPERATURE: 97.4 F

## 2020-01-08 DIAGNOSIS — I10 BENIGN ESSENTIAL HYPERTENSION: ICD-10-CM

## 2020-01-08 DIAGNOSIS — Z23 NEED FOR DTAP VACCINE: ICD-10-CM

## 2020-01-08 DIAGNOSIS — I10 HYPERTENSION GOAL BP (BLOOD PRESSURE) < 140/90: ICD-10-CM

## 2020-01-08 DIAGNOSIS — Z00.00 ENCOUNTER FOR MEDICARE ANNUAL WELLNESS EXAM: Primary | ICD-10-CM

## 2020-01-08 DIAGNOSIS — E78.00 HYPERCHOLESTEREMIA: ICD-10-CM

## 2020-01-08 PROCEDURE — 80053 COMPREHEN METABOLIC PANEL: CPT | Performed by: FAMILY MEDICINE

## 2020-01-08 PROCEDURE — 90471 IMMUNIZATION ADMIN: CPT | Performed by: FAMILY MEDICINE

## 2020-01-08 PROCEDURE — 80061 LIPID PANEL: CPT | Performed by: FAMILY MEDICINE

## 2020-01-08 PROCEDURE — 90715 TDAP VACCINE 7 YRS/> IM: CPT | Performed by: FAMILY MEDICINE

## 2020-01-08 PROCEDURE — 99397 PER PM REEVAL EST PAT 65+ YR: CPT | Mod: 25 | Performed by: FAMILY MEDICINE

## 2020-01-08 PROCEDURE — 36415 COLL VENOUS BLD VENIPUNCTURE: CPT | Performed by: FAMILY MEDICINE

## 2020-01-08 RX ORDER — LABETALOL 200 MG/1
200 TABLET, FILM COATED ORAL 2 TIMES DAILY
Qty: 180 TABLET | Refills: 3 | Status: SHIPPED | OUTPATIENT
Start: 2020-01-08 | End: 2021-02-02

## 2020-01-08 RX ORDER — AMLODIPINE BESYLATE 5 MG/1
5 TABLET ORAL DAILY
Qty: 90 TABLET | Refills: 3 | Status: SHIPPED | OUTPATIENT
Start: 2020-01-08 | End: 2021-02-02

## 2020-01-08 RX ORDER — LOSARTAN POTASSIUM 100 MG/1
100 TABLET ORAL DAILY
Qty: 90 TABLET | Refills: 3 | Status: SHIPPED | OUTPATIENT
Start: 2020-01-08 | End: 2021-01-25

## 2020-01-08 ASSESSMENT — MIFFLIN-ST. JEOR: SCORE: 1204.27

## 2020-01-09 LAB
ALBUMIN SERPL-MCNC: 4.1 G/DL (ref 3.4–5)
ALP SERPL-CCNC: 86 U/L (ref 40–150)
ALT SERPL W P-5'-P-CCNC: 24 U/L (ref 0–50)
ANION GAP SERPL CALCULATED.3IONS-SCNC: 9 MMOL/L (ref 3–14)
AST SERPL W P-5'-P-CCNC: 20 U/L (ref 0–45)
BILIRUB SERPL-MCNC: 0.6 MG/DL (ref 0.2–1.3)
BUN SERPL-MCNC: 16 MG/DL (ref 7–30)
CALCIUM SERPL-MCNC: 9.3 MG/DL (ref 8.5–10.1)
CHLORIDE SERPL-SCNC: 107 MMOL/L (ref 94–109)
CHOLEST SERPL-MCNC: 184 MG/DL
CO2 SERPL-SCNC: 22 MMOL/L (ref 20–32)
CREAT SERPL-MCNC: 0.7 MG/DL (ref 0.52–1.04)
GFR SERPL CREATININE-BSD FRML MDRD: 84 ML/MIN/{1.73_M2}
GLUCOSE SERPL-MCNC: 91 MG/DL (ref 70–99)
HDLC SERPL-MCNC: 94 MG/DL
LDLC SERPL CALC-MCNC: 75 MG/DL
NONHDLC SERPL-MCNC: 90 MG/DL
POTASSIUM SERPL-SCNC: 3.9 MMOL/L (ref 3.4–5.3)
PROT SERPL-MCNC: 7 G/DL (ref 6.8–8.8)
SODIUM SERPL-SCNC: 138 MMOL/L (ref 133–144)
TRIGL SERPL-MCNC: 75 MG/DL

## 2020-12-16 DIAGNOSIS — E78.00 HYPERCHOLESTEREMIA: ICD-10-CM

## 2020-12-16 DIAGNOSIS — I10 HYPERTENSION GOAL BP (BLOOD PRESSURE) < 140/90: ICD-10-CM

## 2020-12-16 RX ORDER — ATORVASTATIN CALCIUM 10 MG/1
TABLET, FILM COATED ORAL
Qty: 90 TABLET | Refills: 0 | Status: SHIPPED | OUTPATIENT
Start: 2020-12-16 | End: 2021-02-19

## 2020-12-17 NOTE — TELEPHONE ENCOUNTER
"Requested Prescriptions   Pending Prescriptions Disp Refills     atorvastatin (LIPITOR) 10 MG tablet [Pharmacy Med Name: ATORVASTATIN 10 MG TABLET] 90 tablet 3     Sig: TAKE 1 TABLET BY MOUTH EVERY DAY       Statins Protocol Passed - 12/16/2020 12:21 AM        Passed - LDL on file in past 12 months     Recent Labs   Lab Test 01/08/20  1334   LDL 75             Passed - No abnormal creatine kinase in past 12 months     No lab results found.             Passed - Recent (12 mo) or future (30 days) visit within the authorizing provider's specialty     Patient has had an office visit with the authorizing provider or a provider within the authorizing providers department within the previous 12 mos or has a future within next 30 days. See \"Patient Info\" tab in inbasket, or \"Choose Columns\" in Meds & Orders section of the refill encounter.              Passed - Medication is active on med list        Passed - Patient is age 18 or older        Passed - No active pregnancy on record        Passed - No positive pregnancy test in past 12 months             Signed Prescriptions:                        Disp   Refills    atorvastatin (LIPITOR) 10 MG tablet        90 tab*0        Sig: TAKE 1 TABLET BY MOUTH EVERY DAY  Authorizing Provider: DL ZAYAS  Ordering User: MELIZA LOYD      "

## 2021-02-18 NOTE — PROGRESS NOTES
Leah is a 75 year old who is being evaluated via a billable video visit.      How would you like to obtain your AVS? MyChart  If the video visit is dropped, the invitation should be resent by: Text to cell phone: [phone]   Will anyone else be joining your video visit? No    Video Start Time: 10:24 AM    Assessment & Plan     Hypertension goal BP (blood pressure) < 140/90  - losartan (COZAAR) 100 MG tablet; Take 1 tablet (100 mg) by mouth daily  - labetalol (NORMODYNE) 200 MG tablet; TAKE 1 TABLET BY MOUTH TWICE A DAY  - atorvastatin (LIPITOR) 10 MG tablet; Take 1 tablet (10 mg) by mouth daily  - Comprehensive metabolic panel    Hypercholesteremia  - atorvastatin (LIPITOR) 10 MG tablet; Take 1 tablet (10 mg) by mouth daily  - Lipid panel reflex to direct LDL Fasting    Review of external notes as documented elsewhere in note  15 minutes spent on the date of the encounter doing chart review, history and exam, documentation and further activities as noted above     Patient Instructions   Once you've received the 2nd covid vaccine, please schedule an appointment for physical and fasting labs.       Raul Hobson MD  [facility]    Subjective   Leah is a 75 year old who presents for the following health issues    HPI       Hyperlipidemia Follow-Up      Are you regularly taking any medication or supplement to lower your cholesterol?   Yes- Lipitor    Are you having muscle aches or other side effects that you think could be caused by your cholesterol lowering medication?  No    Hypertension Follow-up      Do you check your blood pressure regularly outside of the clinic? Yes     Are you following a low salt diet? Yes    Are your blood pressures ever more than 140 on the top number (systolic) OR more   than 90 on the bottom number (diastolic), for example 140/90? No      How many servings of fruits and vegetables do you eat daily?  2-3    On average, how many sweetened beverages do you  drink each day (Examples: soda, juice, sweet tea, etc.  Do NOT count diet or artificially sweetened beverages)?   0    How many days per week do you exercise enough to make your heart beat faster? 6    How many minutes a day do you exercise enough to make your heart beat faster? 20 - 29    How many days per week do you miss taking your medication? 0    Hyperlipidemia Follow-Up      Are you regularly taking any medication or supplement to lower your cholesterol?   Yes- atorvastatin    Are you having muscle aches or other side effects that you think could be caused by your cholesterol lowering medication?  No    Hypertension Follow-up      Do you check your blood pressure regularly outside of the clinic? Yes     Are you following a low salt diet? Yes    Are your blood pressures ever more than 140 on the top number (systolic) OR more   than 90 on the bottom number (diastolic), for example 140/90? No    How many days per week do you miss taking your medication? 0      Review of Systems   Constitutional, HEENT, cardiovascular, pulmonary, gi and gu systems are negative, except as otherwise noted.      Objective           Vitals:  No vitals were obtained today due to virtual visit.    Physical Exam   GENERAL: Healthy, alert and no distress  EYES: Eyes grossly normal to inspection.  No discharge or erythema, or obvious scleral/conjunctival abnormalities.  RESP: No audible wheeze, cough, or visible cyanosis.  No visible retractions or increased work of breathing.    SKIN: Visible skin clear. No significant rash, abnormal pigmentation or lesions.  NEURO: Cranial nerves grossly intact.  Mentation and speech appropriate for age.  PSYCH: Mentation appears normal, affect normal/bright, judgement and insight intact, normal speech and appearance well-groomed.        Video-Visit Details    Type of service:  Video Visit    Video End Time:10:31 AM    Originating Location (pt. Location): Home    Distant Location (provider location):    [facility]     Platform used for Video Visit: Art

## 2021-02-19 ENCOUNTER — VIRTUAL VISIT (OUTPATIENT)
Dept: FAMILY MEDICINE | Facility: CLINIC | Age: 76
End: 2021-02-19
Payer: COMMERCIAL

## 2021-02-19 DIAGNOSIS — I10 HYPERTENSION GOAL BP (BLOOD PRESSURE) < 140/90: ICD-10-CM

## 2021-02-19 DIAGNOSIS — E78.00 HYPERCHOLESTEREMIA: ICD-10-CM

## 2021-02-19 PROCEDURE — 99213 OFFICE O/P EST LOW 20 MIN: CPT | Mod: 95 | Performed by: FAMILY MEDICINE

## 2021-02-19 RX ORDER — ATORVASTATIN CALCIUM 10 MG/1
10 TABLET, FILM COATED ORAL DAILY
Qty: 90 TABLET | Refills: 3 | Status: SHIPPED | OUTPATIENT
Start: 2021-02-19 | End: 2022-03-28

## 2021-02-19 RX ORDER — LOSARTAN POTASSIUM 100 MG/1
100 TABLET ORAL DAILY
Qty: 90 TABLET | Refills: 3 | Status: SHIPPED | OUTPATIENT
Start: 2021-02-19 | End: 2022-04-20

## 2021-02-19 RX ORDER — LABETALOL 200 MG/1
TABLET, FILM COATED ORAL
Qty: 180 TABLET | Refills: 3 | Status: SHIPPED | OUTPATIENT
Start: 2021-02-19 | End: 2022-02-24

## 2021-02-19 RX ORDER — AMLODIPINE BESYLATE 5 MG/1
5 TABLET ORAL DAILY
Qty: 90 TABLET | Refills: 3 | Status: SHIPPED | OUTPATIENT
Start: 2021-02-19 | End: 2022-02-25

## 2021-02-19 NOTE — PATIENT INSTRUCTIONS
Once you've received the 2nd covid vaccine, please schedule an appointment for physical and fasting labs.

## 2021-02-26 ENCOUNTER — IMMUNIZATION (OUTPATIENT)
Dept: NURSING | Facility: CLINIC | Age: 76
End: 2021-02-26
Payer: COMMERCIAL

## 2021-02-26 PROCEDURE — 0001A PR COVID VAC PFIZER DIL RECON 30 MCG/0.3 ML IM: CPT

## 2021-02-26 PROCEDURE — 91300 PR COVID VAC PFIZER DIL RECON 30 MCG/0.3 ML IM: CPT

## 2021-03-13 ENCOUNTER — HEALTH MAINTENANCE LETTER (OUTPATIENT)
Age: 76
End: 2021-03-13

## 2021-03-19 ENCOUNTER — IMMUNIZATION (OUTPATIENT)
Dept: NURSING | Facility: CLINIC | Age: 76
End: 2021-03-19
Attending: FAMILY MEDICINE
Payer: COMMERCIAL

## 2021-03-19 PROCEDURE — 91300 PR COVID VAC PFIZER DIL RECON 30 MCG/0.3 ML IM: CPT

## 2021-03-19 PROCEDURE — 0002A PR COVID VAC PFIZER DIL RECON 30 MCG/0.3 ML IM: CPT

## 2021-05-05 ENCOUNTER — OFFICE VISIT (OUTPATIENT)
Dept: FAMILY MEDICINE | Facility: CLINIC | Age: 76
End: 2021-05-05
Payer: COMMERCIAL

## 2021-05-05 VITALS
WEIGHT: 155.5 LBS | HEIGHT: 67 IN | TEMPERATURE: 98.7 F | DIASTOLIC BLOOD PRESSURE: 72 MMHG | RESPIRATION RATE: 20 BRPM | SYSTOLIC BLOOD PRESSURE: 140 MMHG | HEART RATE: 74 BPM | OXYGEN SATURATION: 98 % | BODY MASS INDEX: 24.4 KG/M2

## 2021-05-05 DIAGNOSIS — E78.5 HYPERLIPIDEMIA LDL GOAL <130: ICD-10-CM

## 2021-05-05 DIAGNOSIS — Z00.00 ENCOUNTER FOR MEDICARE ANNUAL WELLNESS EXAM: Primary | ICD-10-CM

## 2021-05-05 DIAGNOSIS — I10 WHITE COAT SYNDROME WITH DIAGNOSIS OF HYPERTENSION: ICD-10-CM

## 2021-05-05 DIAGNOSIS — E78.00 HYPERCHOLESTEREMIA: ICD-10-CM

## 2021-05-05 DIAGNOSIS — I10 HYPERTENSION GOAL BP (BLOOD PRESSURE) < 140/90: ICD-10-CM

## 2021-05-05 PROCEDURE — 80061 LIPID PANEL: CPT | Performed by: FAMILY MEDICINE

## 2021-05-05 PROCEDURE — 36415 COLL VENOUS BLD VENIPUNCTURE: CPT | Performed by: FAMILY MEDICINE

## 2021-05-05 PROCEDURE — 80053 COMPREHEN METABOLIC PANEL: CPT | Performed by: FAMILY MEDICINE

## 2021-05-05 PROCEDURE — 99397 PER PM REEVAL EST PAT 65+ YR: CPT | Performed by: FAMILY MEDICINE

## 2021-05-05 ASSESSMENT — MIFFLIN-ST. JEOR: SCORE: 1215.03

## 2021-05-05 NOTE — PATIENT INSTRUCTIONS
Dexa if desired, or if ortho thinks so    Patient Education   Personalized Prevention Plan  You are due for the preventive services outlined below.  Your care team is available to assist you in scheduling these services.  If you have already completed any of these items, please share that information with your care team to update in your medical record.  Health Maintenance Due   Topic Date Due     Hepatitis C Screening  Never done     Osteoporosis Screening  11/18/2018     FALL RISK ASSESSMENT  01/08/2021

## 2021-05-05 NOTE — PROGRESS NOTES
"  SUBJECTIVE:   Leah ePña is a 75 year old female who presents for Preventive Visit.    Split Bill scripting  The purpose of this visit is to discuss your medical history and prevent health problems before you are sick. You may be responsible for a co-pay, coinsurance, or deductible if your visit today includes services such as checking on a sore throat, having an x-ray or lab test, or treating and evaluating a new or existing condition :[id number]}  Patient has been advised of split billing requirements and indicates understanding: Yes     Patient checks blood pressure at home to monitor, last reading on 5/4/2021 - 113/66     Are you in the first 12 months of your Medicare Part B coverage?  No    Physical Health:    In general, how would you rate your overall physical health? excellent    Outside of work, how many days during the week do you exercise? 6-7 days/week  (walking)     Outside of work, approximately how many minutes a day do you exercise?greater than 60 minutes    If you drink alcohol do you typically have >3 drinks per day or >7 drinks per week? No    Do you usually eat at least 4 servings of fruit and vegetables a day, include whole grains & fiber and avoid regularly eating high fat or \"junk\" foods? Yes    Do you have any problems taking medications regularly?  No    Do you have any side effects from medications? none    Needs assistance for the following daily activities: no assistance needed    Which of the following safety concerns are present in your home?  none identified     Hearing impairment: Yes, minimal, no hearing screen    In the past 6 months, have you been bothered by leaking of urine? controlled    Mental Health:    In general, how would you rate your overall mental or emotional health? excellent  PHQ-2 Score:  0    Do you feel safe in your environment? Yes    Have you ever done Advance Care Planning? (For example, a Health Directive, POLST, or a discussion with a medical provider or " your loved ones about your wishes): Yes, advance care planning is on file.    Additional concerns to address?  No     Fall risk:  Fallen 2 or more times in the past year?: No  Any fall with injury in the past year?: No      Cognitive Screenin) Repeat 3 items (Leader, Season, Table)    2) Clock draw: NORMALNORMAL  3) 3 item recall: Recalls 3 objects  Results: NORMAL clock, 1-2 items recalled: COGNITIVE IMPAIRMENT LESS LIKELY    Mini-CogTM Copyright ANA LILIA Whitten. Licensed by the author for use in [place] [employer]; reprinted with permission (mamadou@[facility]). All rights reserved.      Do you have sleep apnea, excessive snoring or daytime drowsiness?: no        Hyperlipidemia Follow-Up      Are you regularly taking any medication or supplement to lower your cholesterol?   Yes- atorvastin    Are you having muscle aches or other side effects that you think could be caused by your cholesterol lowering medication?  No    Hypertension Follow-up      Do you check your blood pressure regularly outside of the clinic? Yes     Are you following a low salt diet? Yes    Are your blood pressures ever more than 140 on the top number (systolic) OR more   than 90 on the bottom number (diastolic), for example 140/90? No      Reviewed and updated as needed this visit by clinical staff                 Reviewed and updated as needed this visit by Provider                Social History     Tobacco Use     Smoking status: Former Smoker     Packs/day: 1.00     Years: 10.00     Pack years: 10.00     Types: Cigarettes     Quit date: 1982     Years since quittin.4     Smokeless tobacco: Never Used     Tobacco comment: quit 30+ years ago   Substance Use Topics     Alcohol use: Yes     Comment: social                           Current providers sharing in care for this patient include:  Patient Care Team:  Raul Hobson MD as PCP - General (Family Practice)  IRINA Mendoza MD as MD (Cardiology)  Nathaniel Eppersno MD  "as MD (Orthopedics)  Raul Hobson MD as Assigned PCP    The following health maintenance items are reviewed in Epic and correct as of today:  Health Maintenance   Topic Date Due     HEPATITIS C SCREENING  Never done     DEXA  11/18/2018     FALL RISK ASSESSMENT  01/08/2021     MEDICARE ANNUAL WELLNESS VISIT  05/05/2022     LIPID  01/08/2025     ADVANCE CARE PLANNING  01/08/2025     DTAP/TDAP/TD IMMUNIZATION (3 - Td) 01/08/2030     PHQ-2  Completed     INFLUENZA VACCINE  Completed     Pneumococcal Vaccine: 65+ Years  Completed     ZOSTER IMMUNIZATION  Completed     COVID-19 Vaccine  Completed     Pneumococcal Vaccine: Pediatrics (0 to 5 Years) and At-Risk Patients (6 to 64 Years)  Aged Out     IPV IMMUNIZATION  Aged Out     MENINGITIS IMMUNIZATION  Aged Out     HEPATITIS B IMMUNIZATION  Aged Out     Lab work is in process  Labs reviewed in EPIC    ROS:  Constitutional, HEENT, cardiovascular, pulmonary, gi and gu systems are negative, except as otherwise noted.    OBJECTIVE:   LMP  (LMP Unknown)  Estimated body mass index is 26.5 kg/m  as calculated from the following:    Height as of 1/8/20: 1.645 m (5' 4.76\").    Weight as of 1/8/20: 71.7 kg (158 lb 1.6 oz).  EXAM:   GENERAL APPEARANCE: healthy, alert and no distress  EYES: Eyes grossly normal to inspection, PERRL and conjunctivae and sclerae normal  HENT: ear canals and TM's normal, nose and mouth without ulcers or lesions, oropharynx clear and oral mucous membranes moist  NECK: no adenopathy, no asymmetry, masses, or scars and thyroid normal to palpation  RESP: lungs clear to auscultation - no rales, rhonchi or wheezes  CV: regular rate and rhythm, normal S1 S2, no S3 or S4, no murmur, click or rub, no peripheral edema and peripheral pulses strong  ABDOMEN: soft, nontender, no hepatosplenomegaly, no masses and bowel sounds normal  MS: no musculoskeletal defects are noted and gait is age appropriate without ataxia  SKIN: no suspicious lesions or " "rashes  NEURO: Normal strength and tone, sensory exam grossly normal, mentation intact and speech normal  PSYCH: mentation appears normal and affect normal/bright    Diagnostic Test Results:  Labs reviewed in Epic    ASSESSMENT / PLAN:       ICD-10-CM    1. Encounter for Medicare annual wellness exam  Z00.00    2. White coat syndrome with diagnosis of hypertension  I10 CANCELED: Comprehensive metabolic panel     CANCELED: Lipid panel reflex to direct LDL Fasting   3. Hyperlipidemia LDL goal <130  E78.5        Patient has been advised of split billing requirements and indicates understanding: Yes    COUNSELING:  Reviewed preventive health counseling, as reflected in patient instructions       Regular exercise       Healthy diet/nutrition       Vision screening       Hearing screening       Fall risk prevention       Osteoporosis prevention/bone health    Estimated body mass index is 26.5 kg/m  as calculated from the following:    Height as of 1/8/20: 1.645 m (5' 4.76\").    Weight as of 1/8/20: 71.7 kg (158 lb 1.6 oz).        She reports that she quit smoking about 38 years ago. Her smoking use included cigarettes. She has a 10.00 pack-year smoking history. She has never used smokeless tobacco.    Appropriate preventive services were discussed with this patient, including applicable screening as appropriate for cardiovascular disease, diabetes, osteopenia/osteoporosis, and glaucoma.  As appropriate for age/gender, discussed screening for colorectal cancer, prostate cancer, breast cancer, and cervical cancer. Checklist reviewing preventive services available has been given to the patient.    Reviewed patients plan of care and provided an AVS. The Basic Care Plan (routine screening as documented in Health Maintenance) for Leah meets the Care Plan requirement. This Care Plan has been established and reviewed with the Patient.    Dexa if desired, or if ortho thinks so    Raul Hobson MD  [facility] " [place]

## 2021-05-06 LAB
ALBUMIN SERPL-MCNC: 4.1 G/DL (ref 3.4–5)
ALP SERPL-CCNC: 82 U/L (ref 40–150)
ALT SERPL W P-5'-P-CCNC: 23 U/L (ref 0–50)
ANION GAP SERPL CALCULATED.3IONS-SCNC: 8 MMOL/L (ref 3–14)
AST SERPL W P-5'-P-CCNC: 19 U/L (ref 0–45)
BILIRUB SERPL-MCNC: 0.6 MG/DL (ref 0.2–1.3)
BUN SERPL-MCNC: 16 MG/DL (ref 7–30)
CALCIUM SERPL-MCNC: 9.2 MG/DL (ref 8.5–10.1)
CHLORIDE SERPL-SCNC: 106 MMOL/L (ref 94–109)
CHOLEST SERPL-MCNC: 205 MG/DL
CO2 SERPL-SCNC: 23 MMOL/L (ref 20–32)
CREAT SERPL-MCNC: 0.75 MG/DL (ref 0.52–1.04)
GFR SERPL CREATININE-BSD FRML MDRD: 76 ML/MIN/{1.73_M2}
GLUCOSE SERPL-MCNC: 93 MG/DL (ref 70–99)
HDLC SERPL-MCNC: 102 MG/DL
LDLC SERPL CALC-MCNC: 87 MG/DL
NONHDLC SERPL-MCNC: 103 MG/DL
POTASSIUM SERPL-SCNC: 4 MMOL/L (ref 3.4–5.3)
PROT SERPL-MCNC: 6.9 G/DL (ref 6.8–8.8)
SODIUM SERPL-SCNC: 137 MMOL/L (ref 133–144)
TRIGL SERPL-MCNC: 78 MG/DL

## 2021-05-13 NOTE — RESULT ENCOUNTER NOTE
Nathanael Lynn: although total cholesterol is up, overall no worrisome change. Continue current medications, diet and exercise, recheck in 1 year. Contact if questions- keep staying healthy!    Raul

## 2021-10-23 ENCOUNTER — HEALTH MAINTENANCE LETTER (OUTPATIENT)
Age: 76
End: 2021-10-23

## 2022-02-24 DIAGNOSIS — I10 HYPERTENSION GOAL BP (BLOOD PRESSURE) < 140/90: ICD-10-CM

## 2022-02-24 RX ORDER — LABETALOL 200 MG/1
TABLET, FILM COATED ORAL
Qty: 180 TABLET | Refills: 0 | Status: SHIPPED | OUTPATIENT
Start: 2022-02-24 | End: 2022-05-23

## 2022-02-24 NOTE — TELEPHONE ENCOUNTER
Approved per [facility] protocol.    Maria Guadalupe Casas RN  [facility]

## 2022-02-25 DIAGNOSIS — I10 ESSENTIAL (PRIMARY) HYPERTENSION: ICD-10-CM

## 2022-02-25 RX ORDER — AMLODIPINE BESYLATE 5 MG/1
TABLET ORAL
Qty: 90 TABLET | Refills: 1 | Status: SHIPPED | OUTPATIENT
Start: 2022-02-25 | End: 2022-08-23

## 2022-02-25 NOTE — TELEPHONE ENCOUNTER
Approved per [facility] protocol.    Maria Guadalupe Casas RN  [facility]

## 2022-03-28 DIAGNOSIS — E78.00 HYPERCHOLESTEREMIA: ICD-10-CM

## 2022-03-28 DIAGNOSIS — I10 HYPERTENSION GOAL BP (BLOOD PRESSURE) < 140/90: ICD-10-CM

## 2022-03-28 RX ORDER — ATORVASTATIN CALCIUM 10 MG/1
TABLET, FILM COATED ORAL
Qty: 90 TABLET | Refills: 3 | Status: SHIPPED | OUTPATIENT
Start: 2022-03-28 | End: 2023-03-24

## 2022-03-28 NOTE — TELEPHONE ENCOUNTER
"Requested Prescriptions   Signed Prescriptions Disp Refills    atorvastatin (LIPITOR) 10 MG tablet 90 tablet 3     Sig: TAKE 1 TABLET BY MOUTH EVERY DAY       Statins Protocol Passed - 3/28/2022 12:33 AM        Passed - LDL on file in past 12 months     Recent Labs   Lab Test 05/05/21  1148   LDL 87             Passed - No abnormal creatine kinase in past 12 months     No lab results found.             Passed - Recent (12 mo) or future (30 days) visit within the authorizing provider's specialty     Patient has had an office visit with the authorizing provider or a provider within the authorizing providers department within the previous 12 mos or has a future within next 30 days. See \"Patient Info\" tab in inbasket, or \"Choose Columns\" in Meds & Orders section of the refill encounter.              Passed - Medication is active on med list        Passed - Patient is age 18 or older        Passed - No active pregnancy on record        Passed - No positive pregnancy test in past 12 months           Wendy Salinas RN  [facility]     "

## 2022-04-20 DIAGNOSIS — I10 HYPERTENSION GOAL BP (BLOOD PRESSURE) < 140/90: ICD-10-CM

## 2022-04-20 RX ORDER — LOSARTAN POTASSIUM 100 MG/1
TABLET ORAL
Qty: 90 TABLET | Refills: 3 | Status: SHIPPED | OUTPATIENT
Start: 2022-04-20 | End: 2023-05-02

## 2022-04-20 NOTE — TELEPHONE ENCOUNTER
"Requested Prescriptions   Pending Prescriptions Disp Refills     losartan (COZAAR) 100 MG tablet [Pharmacy Med Name: LOSARTAN POTASSIUM 100 MG TAB] 90 tablet 3     Sig: TAKE 1 TABLET BY MOUTH EVERY DAY       Angiotensin-II Receptors Failed - 4/20/2022 12:30 AM        Failed - Last blood pressure under 140/90 in past 12 months     BP Readings from Last 3 Encounters:   05/05/21 (!) 140/72   01/08/20 136/80   11/12/18 140/76                 Passed - Recent (12 mo) or future (30 days) visit within the authorizing provider's specialty     Patient has had an office visit with the authorizing provider or a provider within the authorizing providers department within the previous 12 mos or has a future within next 30 days. See \"Patient Info\" tab in inbasket, or \"Choose Columns\" in Meds & Orders section of the refill encounter.              Passed - Medication is active on med list        Passed - Patient is age 18 or older        Passed - No active pregnancy on record        Passed - Normal serum creatinine on file in past 12 months     Recent Labs   Lab Test 05/05/21  1148   CR 0.75       Ok to refill medication if creatinine is low          Passed - Normal serum potassium on file in past 12 months     Recent Labs   Lab Test 05/05/21  1148   POTASSIUM 4.0                    Passed - No positive pregnancy test in past 12 months           Wendy Salinas RN  [facility]     "

## 2022-05-23 DIAGNOSIS — I10 HYPERTENSION GOAL BP (BLOOD PRESSURE) < 140/90: ICD-10-CM

## 2022-05-23 RX ORDER — LABETALOL 200 MG/1
TABLET, FILM COATED ORAL
Qty: 60 TABLET | Refills: 0 | Status: SHIPPED | OUTPATIENT
Start: 2022-05-23 | End: 2022-06-21

## 2022-05-23 NOTE — TELEPHONE ENCOUNTER
Routed to TC/reception pool    Please assist pt to set up annual appointment  Odette fill sent    Marissa Watkins RN   [facility]

## 2022-07-30 ENCOUNTER — HEALTH MAINTENANCE LETTER (OUTPATIENT)
Age: 77
End: 2022-07-30

## 2022-08-20 DIAGNOSIS — I10 ESSENTIAL (PRIMARY) HYPERTENSION: ICD-10-CM

## 2022-08-22 NOTE — TELEPHONE ENCOUNTER
"Requested Prescriptions   Pending Prescriptions Disp Refills     amLODIPine (NORVASC) 5 MG tablet [Pharmacy Med Name: AMLODIPINE BESYLATE 5 MG TAB] 90 tablet 1     Sig: TAKE 1 TABLET BY MOUTH EVERY DAY       Calcium Channel Blockers Protocol  Failed - 8/20/2022 12:42 AM        Failed - Blood pressure under 140/90 in past 12 months     BP Readings from Last 3 Encounters:   05/05/21 (!) 140/72   01/08/20 136/80   11/12/18 140/76                 Failed - Recent (12 mo) or future (30 days) visit within the authorizing provider's specialty     Patient has had an office visit with the authorizing provider or a provider within the authorizing providers department within the previous 12 mos or has a future within next 30 days. See \"Patient Info\" tab in inbasket, or \"Choose Columns\" in Meds & Orders section of the refill encounter.              Failed - Normal serum creatinine on file in past 12 months     Recent Labs   Lab Test 05/05/21  1148   CR 0.75       Ok to refill medication if creatinine is low          Passed - Medication is active on med list        Passed - Patient is age 18 or older        Passed - No active pregnancy on record        Passed - No positive pregnancy test in past 12 months           Has appt 10-7  Nory HENAO RN    "

## 2022-08-23 RX ORDER — AMLODIPINE BESYLATE 5 MG/1
TABLET ORAL
Qty: 90 TABLET | Refills: 1 | Status: SHIPPED | OUTPATIENT
Start: 2022-08-23 | End: 2023-02-17

## 2022-10-07 ENCOUNTER — OFFICE VISIT (OUTPATIENT)
Dept: FAMILY MEDICINE | Facility: CLINIC | Age: 77
End: 2022-10-07
Payer: COMMERCIAL

## 2022-10-07 VITALS
OXYGEN SATURATION: 98 % | BODY MASS INDEX: 24.27 KG/M2 | DIASTOLIC BLOOD PRESSURE: 78 MMHG | SYSTOLIC BLOOD PRESSURE: 173 MMHG | WEIGHT: 151 LBS | TEMPERATURE: 97.9 F | HEIGHT: 66 IN | HEART RATE: 71 BPM

## 2022-10-07 DIAGNOSIS — I10 WHITE COAT SYNDROME WITH HYPERTENSION: ICD-10-CM

## 2022-10-07 DIAGNOSIS — E78.5 HYPERLIPIDEMIA LDL GOAL <130: ICD-10-CM

## 2022-10-07 DIAGNOSIS — Z00.00 ENCOUNTER FOR MEDICARE ANNUAL WELLNESS EXAM: Primary | ICD-10-CM

## 2022-10-07 DIAGNOSIS — F33.8 SEASONAL AFFECTIVE DISORDER (H): ICD-10-CM

## 2022-10-07 DIAGNOSIS — Z23 NEED FOR COVID-19 VACCINE: ICD-10-CM

## 2022-10-07 PROCEDURE — 0124A COVID-19,PF,PFIZER BOOSTER BIVALENT: CPT | Performed by: FAMILY MEDICINE

## 2022-10-07 PROCEDURE — 80061 LIPID PANEL: CPT | Performed by: FAMILY MEDICINE

## 2022-10-07 PROCEDURE — 80053 COMPREHEN METABOLIC PANEL: CPT | Performed by: FAMILY MEDICINE

## 2022-10-07 PROCEDURE — 99397 PER PM REEVAL EST PAT 65+ YR: CPT | Mod: 25 | Performed by: FAMILY MEDICINE

## 2022-10-07 PROCEDURE — 91312 COVID-19,PF,PFIZER BOOSTER BIVALENT: CPT | Performed by: FAMILY MEDICINE

## 2022-10-07 PROCEDURE — 36415 COLL VENOUS BLD VENIPUNCTURE: CPT | Performed by: FAMILY MEDICINE

## 2022-10-07 ASSESSMENT — ENCOUNTER SYMPTOMS
HEADACHES: 0
JOINT SWELLING: 0
NERVOUS/ANXIOUS: 0
HEARTBURN: 0
EYE PAIN: 0
NAUSEA: 0
HEMATOCHEZIA: 0
PALPITATIONS: 0
ARTHRALGIAS: 1
CHILLS: 0
SORE THROAT: 0
DIZZINESS: 0
HEMATURIA: 0
CONSTIPATION: 0
SHORTNESS OF BREATH: 0
PARESTHESIAS: 0
WEAKNESS: 0
DYSURIA: 0
MYALGIAS: 0
FREQUENCY: 0
BREAST MASS: 0
DIARRHEA: 0
ABDOMINAL PAIN: 0
COUGH: 0
FEVER: 0

## 2022-10-07 ASSESSMENT — ACTIVITIES OF DAILY LIVING (ADL): CURRENT_FUNCTION: NO ASSISTANCE NEEDED

## 2022-10-07 ASSESSMENT — PAIN SCALES - GENERAL: PAINLEVEL: NO PAIN (0)

## 2022-10-07 NOTE — PROGRESS NOTES
"SUBJECTIVE:   Leah is a 76 year old who presents for Preventive Visit.    Patient has been advised of split billing requirements and indicates understanding: Yes     Are you in the first 12 months of your Medicare coverage?  No    Recently moved into independent living with her  at the [facility] in [place]. She brings with a log of home blood pressures, 100-120s with HR 60-80s. She notes her pressures are usually elevated in the clinic. She is active, lots of walking and goes to [employer]. Still cooking at home.    Healthy Habits:     In general, how would you rate your overall health?  Good    Frequency of exercise:  4-5 days/week    Duration of exercise:  45-60 minutes    Do you usually eat at least 4 servings of fruit and vegetables a day, include whole grains    & fiber and avoid regularly eating high fat or \"junk\" foods?  Yes    Taking medications regularly:  Yes    Medication side effects:  None    Ability to successfully perform activities of daily living:  No assistance needed    Home Safety:  No safety concerns identified    Hearing Impairment:  No hearing concerns    In the past 6 months, have you been bothered by leaking of urine? Yes    In general, how would you rate your overall mental or emotional health?  Good      PHQ-2 Total Score: 0    Additional concerns today:  No    Do you feel safe in your environment? No    Have you ever done Advance Care Planning? (For example, a Health Directive, POLST, or a discussion with a medical provider or your loved ones about your wishes): Yes, advance care planning is on file.       Fall risk  Fallen 2 or more times in the past year?: No  Any fall with injury in the past year?: No    Cognitive Screening     Do you have sleep apnea, excessive snoring or daytime drowsiness?: no    Reviewed and updated as needed this visit by clinical staff   Tobacco  Allergies  Meds   Med Hx  Surg Hx  Fam Hx  Soc Hx          Reviewed and updated as needed this visit by " Provider     Meds               Social History     Tobacco Use     Smoking status: Former Smoker     Packs/day: 1.00     Years: 10.00     Pack years: 10.00     Types: Cigarettes     Quit date: 1982     Years since quittin.9     Smokeless tobacco: Never Used     Tobacco comment: quit 30+ years ago   Substance Use Topics     Alcohol use: Yes     Comment: social     If you drink alcohol do you typically have >3 drinks per day or >7 drinks per week? No    Alcohol Use 10/7/2022   Prescreen: >3 drinks/day or >7 drinks/week? No   Prescreen: >3 drinks/day or >7 drinks/week? -   No flowsheet data found.    Current providers sharing in care for this patient include:   Patient Care Team:  Raul Hobson MD as PCP - General (Family Practice)  IRINA Mendoza MD as MD (Cardiology)  Nathaniel Epperson MD as MD (Orthopedics)  Raul Hobson MD as Assigned PCP    The following health maintenance items are reviewed in Epic and correct as of today:  Health Maintenance   Topic Date Due     HEPATITIS C SCREENING  Never done     DEXA  2018     INFLUENZA VACCINE (1) 2022     MEDICARE ANNUAL WELLNESS VISIT  10/07/2023     ANNUAL REVIEW OF HM ORDERS  10/07/2023     FALL RISK ASSESSMENT  10/07/2023     LIPID  [id number]     ADVANCE CARE PLANNING  10/07/2027     DTAP/TDAP/TD IMMUNIZATION (3 - Td or Tdap) 2030     PHQ-2 (once per calendar year)  Completed     Pneumococcal Vaccine: 65+ Years  Completed     ZOSTER IMMUNIZATION  Completed     COVID-19 Vaccine  Completed     IPV IMMUNIZATION  Aged Out     MENINGITIS IMMUNIZATION  Aged Out     HEPATITIS B IMMUNIZATION  Aged Out     Pertinent mammograms are reviewed under the imaging tab.    Review of Systems   Constitutional: Negative for chills and fever.   HENT: Negative for congestion, ear pain, hearing loss and sore throat.    Eyes: Negative for pain and visual disturbance.   Respiratory: Negative for cough and shortness of breath.   "  Cardiovascular: Negative for chest pain, palpitations and peripheral edema.   Gastrointestinal: Negative for abdominal pain, constipation, diarrhea, heartburn, hematochezia and nausea.   Breasts:  Negative for tenderness, breast mass and discharge.   Genitourinary: Positive for urgency. Negative for dysuria, frequency, genital sores, hematuria, pelvic pain, vaginal bleeding and vaginal discharge.   Musculoskeletal: Positive for arthralgias. Negative for joint swelling and myalgias.   Skin: Negative for rash.   Neurological: Negative for dizziness, weakness, headaches and paresthesias.   Psychiatric/Behavioral: Negative for mood changes. The patient is not nervous/anxious.        OBJECTIVE:   BP (!) 173/78 (BP Location: Right arm, Patient Position: Sitting, Cuff Size: Adult Regular)   Pulse 71   Temp 97.9  F (36.6  C) (Temporal)   Ht 1.677 m (5' 6.02\")   Wt 68.5 kg (151 lb)   LMP  (LMP Unknown)   SpO2 98%   BMI 24.35 kg/m   Estimated body mass index is 24.35 kg/m  as calculated from the following:    Height as of this encounter: 1.677 m (5' 6.02\").    Weight as of this encounter: 68.5 kg (151 lb).  Physical Exam  GENERAL APPEARANCE: healthy, alert and no distress  EYES: Eyes grossly normal to inspection, PERRL and conjunctivae and sclerae normal  HENT: ear canals and TM's normal, nose and mouth without ulcers or lesions, oropharynx clear and oral mucous membranes moist  NECK: no adenopathy, no asymmetry, masses, or scars and thyroid normal to palpation  RESP: lungs clear to auscultation - no rales, rhonchi or wheezes  BREAST: normal without masses, tenderness or nipple discharge and no palpable axillary masses or adenopathy  CV: regular rate and rhythm, normal S1 S2, no S3 or S4, no murmur, click or rub, no peripheral edema and peripheral pulses strong  ABDOMEN: soft, nontender, no hepatosplenomegaly, no masses and bowel sounds normal  MS: no musculoskeletal defects are noted and gait is age appropriate " "without ataxia  SKIN: no suspicious lesions or rashes  NEURO: Normal strength and tone, sensory exam grossly normal, mentation intact and speech normal  PSYCH: mentation appears normal and affect normal/bright    Diagnostic Test Results:  Labs reviewed in Epic    ASSESSMENT / PLAN:   Leah was seen today for wellness visit.    Diagnoses and all orders for this visit:    Encounter for Medicare annual wellness exam    White coat syndrome with hypertension  Well controlled otherwise per patient home log.  -     Comprehensive metabolic panel (BMP + Alb, Alk Phos, ALT, AST, Total. Bili, TP); Future    Hyperlipidemia LDL goal <130  -     Lipid panel reflex to direct LDL Fasting; Future    Seasonal affective disorder (H)    Need for COVID-19 vaccine  -     COVID-19,PF,PFIZER BOOSTER BIVALENT    Other orders  -     REVIEW OF HEALTH MAINTENANCE PROTOCOL ORDERS    COUNSELING:  Reviewed preventive health counseling, as reflected in patient instructions    Estimated body mass index is 24.35 kg/m  as calculated from the following:    Height as of this encounter: 1.677 m (5' 6.02\").    Weight as of this encounter: 68.5 kg (151 lb).        She reports that she quit smoking about 39 years ago. Her smoking use included cigarettes. She has a 10.00 pack-year smoking history. She has never used smokeless tobacco.      Appropriate preventive services were discussed with this patient, including applicable screening as appropriate for cardiovascular disease, diabetes, osteopenia/osteoporosis, and glaucoma.  As appropriate for age/gender, discussed screening for colorectal cancer, prostate cancer, breast cancer, and cervical cancer. Checklist reviewing preventive services available has been given to the patient.    Reviewed patients plan of care and provided an AVS. The Basic Care Plan (routine screening as documented in Health Maintenance) for Leah meets the Care Plan requirement. This Care Plan has been established and reviewed with the " Patient.    Mandy Brito, Nurse Practitioner Student  The information in this document, created by the nurse practioner student for me, accurately reflects the services I personally performed and the decisions made by me. I have reviewed and approved this document for accuracy prior to leaving the patient care area.    Raul Hobson MD  [facility]

## 2022-10-08 LAB
ALBUMIN SERPL-MCNC: 3.9 G/DL (ref 3.4–5)
ALP SERPL-CCNC: 82 U/L (ref 40–150)
ALT SERPL W P-5'-P-CCNC: 20 U/L (ref 0–50)
ANION GAP SERPL CALCULATED.3IONS-SCNC: 8 MMOL/L (ref 3–14)
AST SERPL W P-5'-P-CCNC: 19 U/L (ref 0–45)
BILIRUB SERPL-MCNC: 0.6 MG/DL (ref 0.2–1.3)
BUN SERPL-MCNC: 12 MG/DL (ref 7–30)
CALCIUM SERPL-MCNC: 9.2 MG/DL (ref 8.5–10.1)
CHLORIDE BLD-SCNC: 106 MMOL/L (ref 94–109)
CHOLEST SERPL-MCNC: 191 MG/DL
CO2 SERPL-SCNC: 24 MMOL/L (ref 20–32)
CREAT SERPL-MCNC: 0.69 MG/DL (ref 0.52–1.04)
FASTING STATUS PATIENT QL REPORTED: YES
GFR SERPL CREATININE-BSD FRML MDRD: 88 ML/MIN/1.73M2
GLUCOSE BLD-MCNC: 93 MG/DL (ref 70–99)
HDLC SERPL-MCNC: 96 MG/DL
LDLC SERPL CALC-MCNC: 76 MG/DL
NONHDLC SERPL-MCNC: 95 MG/DL
POTASSIUM BLD-SCNC: 3.8 MMOL/L (ref 3.4–5.3)
PROT SERPL-MCNC: 6.5 G/DL (ref 6.8–8.8)
SODIUM SERPL-SCNC: 138 MMOL/L (ref 133–144)
TRIGL SERPL-MCNC: 96 MG/DL

## 2022-10-09 ENCOUNTER — HEALTH MAINTENANCE LETTER (OUTPATIENT)
Age: 77
End: 2022-10-09

## 2022-10-09 NOTE — RESULT ENCOUNTER NOTE
Nathanael Lynn: Your recent results are within normal limits, minor abnormalities not significant.  Continue current medication diet and exercise, repeat in 1 year.  Contact if questions, nice to see you!    Raul AREVALO

## 2022-10-14 ENCOUNTER — MYC MEDICAL ADVICE (OUTPATIENT)
Dept: FAMILY MEDICINE | Facility: CLINIC | Age: 77
End: 2022-10-14

## 2023-02-16 DIAGNOSIS — I10 ESSENTIAL (PRIMARY) HYPERTENSION: ICD-10-CM

## 2023-02-16 NOTE — TELEPHONE ENCOUNTER
"Requested Prescriptions   Pending Prescriptions Disp Refills     amLODIPine (NORVASC) 5 MG tablet [Pharmacy Med Name: AMLODIPINE BESYLATE 5 MG TAB] 90 tablet 1     Sig: TAKE 1 TABLET BY MOUTH EVERY DAY       Calcium Channel Blockers Protocol  Failed - 2/16/2023 12:47 AM        Failed - Blood pressure under 140/90 in past 12 months     BP Readings from Last 3 Encounters:   10/07/22 (!) 173/78   05/05/21 (!) 140/72   01/08/20 136/80                 Passed - Recent (12 mo) or future (30 days) visit within the authorizing provider's specialty     Patient has had an office visit with the authorizing provider or a provider within the authorizing providers department within the previous 12 mos or has a future within next 30 days. See \"Patient Info\" tab in inbasket, or \"Choose Columns\" in Meds & Orders section of the refill encounter.              Passed - Medication is active on med list        Passed - Patient is age 18 or older        Passed - No active pregnancy on record        Passed - Normal serum creatinine on file in past 12 months     Recent Labs   Lab Test 10/07/22  1226   CR 0.69       Ok to refill medication if creatinine is low          Passed - No positive pregnancy test in past 12 months         Routing refill request to provider for review/approval because medication did not pass protocol.    Pt was seen on 10/07/22    Marlee Laboy RN  [facility]   "

## 2023-02-17 RX ORDER — AMLODIPINE BESYLATE 5 MG/1
TABLET ORAL
Qty: 90 TABLET | Refills: 1 | Status: SHIPPED | OUTPATIENT
Start: 2023-02-17 | End: 2023-08-24

## 2023-03-24 DIAGNOSIS — E78.00 HYPERCHOLESTEREMIA: ICD-10-CM

## 2023-03-24 DIAGNOSIS — I10 HYPERTENSION GOAL BP (BLOOD PRESSURE) < 140/90: ICD-10-CM

## 2023-03-24 RX ORDER — ATORVASTATIN CALCIUM 10 MG/1
TABLET, FILM COATED ORAL
Qty: 90 TABLET | Refills: 3 | Status: SHIPPED | OUTPATIENT
Start: 2023-03-24 | End: 2023-10-11

## 2023-03-24 NOTE — TELEPHONE ENCOUNTER
"Requested Prescriptions   Pending Prescriptions Disp Refills     atorvastatin (LIPITOR) 10 MG tablet [Pharmacy Med Name: ATORVASTATIN 10 MG TABLET] 90 tablet 3     Sig: TAKE 1 TABLET BY MOUTH EVERY DAY       Statins Protocol Passed - 3/24/2023 12:52 AM        Passed - LDL on file in past 12 months     Recent Labs   Lab Test 10/07/22  1226   LDL 76             Passed - No abnormal creatine kinase in past 12 months     No lab results found.             Passed - Recent (12 mo) or future (30 days) visit within the authorizing provider's specialty     Patient has had an office visit with the authorizing provider or a provider within the authorizing providers department within the previous 12 mos or has a future within next 30 days. See \"Patient Info\" tab in inbasket, or \"Choose Columns\" in Meds & Orders section of the refill encounter.              Passed - Medication is active on med list        Passed - Patient is age 18 or older        Passed - No active pregnancy on record        Passed - No positive pregnancy test in past 12 months           Prescription approved per [facility] Refill Protocol.    Marlee Laboy RN  [facility]   "

## 2023-08-23 DIAGNOSIS — I10 ESSENTIAL (PRIMARY) HYPERTENSION: ICD-10-CM

## 2023-08-23 NOTE — TELEPHONE ENCOUNTER
"Requested Prescriptions   Pending Prescriptions Disp Refills    amLODIPine (NORVASC) 5 MG tablet [Pharmacy Med Name: AMLODIPINE BESYLATE 5 MG TAB] 90 tablet 1     Sig: TAKE 1 TABLET BY MOUTH EVERY DAY       Calcium Channel Blockers Protocol  Failed - 8/23/2023 12:47 AM        Failed - Blood pressure under 140/90 in past 12 months     BP Readings from Last 3 Encounters:   10/07/22 (!) 173/78   05/05/21 (!) 140/72   01/08/20 136/80                 Passed - Recent (12 mo) or future (30 days) visit within the authorizing provider's specialty     Patient has had an office visit with the authorizing provider or a provider within the authorizing providers department within the previous 12 mos or has a future within next 30 days. See \"Patient Info\" tab in inbasket, or \"Choose Columns\" in Meds & Orders section of the refill encounter.              Passed - Medication is active on med list        Passed - Patient is age 18 or older        Passed - No active pregnancy on record        Passed - Normal serum creatinine on file in past 12 months     Recent Labs   Lab Test 10/07/22  1226   CR 0.69       Ok to refill medication if creatinine is low          Passed - No positive pregnancy test in past 12 months          Routing refill request to provider for review/approval because medication did not pass protocol.    Pt has a appointment on 10/11/22    Marlee Laboy RN  [facility]   "

## 2023-08-24 RX ORDER — AMLODIPINE BESYLATE 5 MG/1
TABLET ORAL
Qty: 90 TABLET | Refills: 3 | Status: SHIPPED | OUTPATIENT
Start: 2023-08-24 | End: 2024-08-19

## 2023-10-09 NOTE — TELEPHONE ENCOUNTER
Pt was calling in about a letter she received being told she needed to schedule an appointment for a bp check. Pt wants to speak with someone to explain why she does not want to come in for an appointment. Pt can be reached @ [phone] bari   no anesthesia complications noted   tachycardia improving, from 120's preop to 110's

## 2023-10-11 ENCOUNTER — OFFICE VISIT (OUTPATIENT)
Dept: FAMILY MEDICINE | Facility: CLINIC | Age: 78
End: 2023-10-11
Payer: COMMERCIAL

## 2023-10-11 VITALS
TEMPERATURE: 98.2 F | HEART RATE: 74 BPM | DIASTOLIC BLOOD PRESSURE: 73 MMHG | SYSTOLIC BLOOD PRESSURE: 170 MMHG | RESPIRATION RATE: 15 BRPM | BODY MASS INDEX: 25.52 KG/M2 | WEIGHT: 149.5 LBS | HEIGHT: 64 IN | OXYGEN SATURATION: 97 %

## 2023-10-11 DIAGNOSIS — M17.11 OSTEOARTHRITIS OF RIGHT KNEE, UNSPECIFIED OSTEOARTHRITIS TYPE: ICD-10-CM

## 2023-10-11 DIAGNOSIS — E78.00 HYPERCHOLESTEREMIA: ICD-10-CM

## 2023-10-11 DIAGNOSIS — Z00.00 ENCOUNTER FOR PREVENTIVE CARE: Primary | ICD-10-CM

## 2023-10-11 DIAGNOSIS — I10 WHITE COAT SYNDROME WITH DIAGNOSIS OF HYPERTENSION: ICD-10-CM

## 2023-10-11 DIAGNOSIS — Z23 NEED FOR VACCINATION: ICD-10-CM

## 2023-10-11 LAB
ALBUMIN SERPL BCG-MCNC: 4.4 G/DL (ref 3.5–5.2)
ALP SERPL-CCNC: 77 U/L (ref 35–104)
ALT SERPL W P-5'-P-CCNC: 15 U/L (ref 0–50)
ANION GAP SERPL CALCULATED.3IONS-SCNC: 10 MMOL/L (ref 7–15)
AST SERPL W P-5'-P-CCNC: 24 U/L (ref 0–45)
BILIRUB SERPL-MCNC: 0.5 MG/DL
BUN SERPL-MCNC: 18.6 MG/DL (ref 8–23)
CALCIUM SERPL-MCNC: 9.6 MG/DL (ref 8.8–10.2)
CHLORIDE SERPL-SCNC: 105 MMOL/L (ref 98–107)
CHOLEST SERPL-MCNC: 188 MG/DL
CREAT SERPL-MCNC: 0.74 MG/DL (ref 0.51–0.95)
DEPRECATED HCO3 PLAS-SCNC: 23 MMOL/L (ref 22–29)
EGFRCR SERPLBLD CKD-EPI 2021: 82 ML/MIN/1.73M2
GLUCOSE SERPL-MCNC: 100 MG/DL (ref 70–99)
HDLC SERPL-MCNC: 92 MG/DL
LDLC SERPL CALC-MCNC: 81 MG/DL
NONHDLC SERPL-MCNC: 96 MG/DL
POTASSIUM SERPL-SCNC: 4.4 MMOL/L (ref 3.4–5.3)
PROT SERPL-MCNC: 6.5 G/DL (ref 6.4–8.3)
SODIUM SERPL-SCNC: 138 MMOL/L (ref 135–145)
TRIGL SERPL-MCNC: 73 MG/DL

## 2023-10-11 PROCEDURE — G0008 ADMIN INFLUENZA VIRUS VAC: HCPCS | Performed by: FAMILY MEDICINE

## 2023-10-11 PROCEDURE — 80061 LIPID PANEL: CPT | Performed by: FAMILY MEDICINE

## 2023-10-11 PROCEDURE — 36415 COLL VENOUS BLD VENIPUNCTURE: CPT | Performed by: FAMILY MEDICINE

## 2023-10-11 PROCEDURE — G0439 PPPS, SUBSEQ VISIT: HCPCS | Performed by: FAMILY MEDICINE

## 2023-10-11 PROCEDURE — 80053 COMPREHEN METABOLIC PANEL: CPT | Performed by: FAMILY MEDICINE

## 2023-10-11 PROCEDURE — 90662 IIV NO PRSV INCREASED AG IM: CPT | Performed by: FAMILY MEDICINE

## 2023-10-11 PROCEDURE — 99214 OFFICE O/P EST MOD 30 MIN: CPT | Mod: 25 | Performed by: FAMILY MEDICINE

## 2023-10-11 RX ORDER — ATORVASTATIN CALCIUM 10 MG/1
10 TABLET, FILM COATED ORAL DAILY
Qty: 90 TABLET | Refills: 3 | Status: SHIPPED | OUTPATIENT
Start: 2023-10-11 | End: 2024-09-25

## 2023-10-11 RX ORDER — LABETALOL 200 MG/1
TABLET, FILM COATED ORAL
Qty: 180 TABLET | Refills: 3 | Status: SHIPPED | OUTPATIENT
Start: 2023-10-11

## 2023-10-11 RX ORDER — LOSARTAN POTASSIUM 100 MG/1
100 TABLET ORAL DAILY
Qty: 90 TABLET | Refills: 3 | Status: SHIPPED | OUTPATIENT
Start: 2023-10-11

## 2023-10-11 ASSESSMENT — ENCOUNTER SYMPTOMS
PALPITATIONS: 0
HEMATURIA: 0
DYSURIA: 0
ABDOMINAL PAIN: 0
NERVOUS/ANXIOUS: 0
HEARTBURN: 0
COUGH: 0
ARTHRALGIAS: 0
NAUSEA: 0
JOINT SWELLING: 0
WEAKNESS: 0
HEADACHES: 0
FREQUENCY: 0
HEMATOCHEZIA: 0
SORE THROAT: 0
FEVER: 0
SHORTNESS OF BREATH: 0
DIZZINESS: 0
BREAST MASS: 0
CHILLS: 0
PARESTHESIAS: 0
DIARRHEA: 0
MYALGIAS: 0
CONSTIPATION: 0
EYE PAIN: 0

## 2023-10-11 ASSESSMENT — ACTIVITIES OF DAILY LIVING (ADL): CURRENT_FUNCTION: NO ASSISTANCE NEEDED

## 2023-10-11 ASSESSMENT — PAIN SCALES - GENERAL: PAINLEVEL: NO PAIN (0)

## 2023-10-11 NOTE — PROGRESS NOTES
"SUBJECTIVE:   Leah is a 77 year old who presents for Preventive Visit.        10/11/2023    10:08 AM   Additional Questions   Roomed by Albino Cortés       Are you in the first 12 months of your Medicare coverage?  No    Healthy Habits:     In general, how would you rate your overall health?  Good    Frequency of exercise:  2-3 days/week    Duration of exercise:  45-60 minutes    Do you usually eat at least 4 servings of fruit and vegetables a day, include whole grains    & fiber and avoid regularly eating high fat or \"junk\" foods?  Yes    Taking medications regularly:  Yes    Medication side effects:  None    Ability to successfully perform activities of daily living:  No assistance needed    Home Safety:  No safety concerns identified    Hearing Impairment:  No hearing concerns    In the past 6 months, have you been bothered by leaking of urine?  No    In general, how would you rate your overall mental or emotional health?  Good    Additional concerns today:  No      Today's PHQ-2 Score:       10/11/2023     9:31 AM   PHQ-2 ( 1999 [employer])   Q1: Little interest or pleasure in doing things 0   Q2: Feeling down, depressed or hopeless 0   PHQ-2 Score 0   Q1: Little interest or pleasure in doing things Not at all   Q2: Feeling down, depressed or hopeless Not at all   PHQ-2 Score 0           Have you ever done Advance Care Planning? (For example, a Health Directive, POLST, or a discussion with a medical provider or your loved ones about your wishes): Yes, advance care planning is on file.    Fall risk  Fallen 2 or more times in the past year?: No  Any fall with injury in the past year?: No    Cognitive Screening   1) Repeat 3 items (Leader, Season, Table)    2) Clock draw: NORMAL  3) 3 item recall: Recalls 2 objects   Results: NORMAL clock, 1-2 items recalled: COGNITIVE IMPAIRMENT LESS LIKELY    Mini-CogTM Copyright S Maria Dolores. Licensed by the author for use in [facility]; reprinted with permission " (mamadou@[facility]). All rights reserved.      Do you have sleep apnea, excessive snoring or daytime drowsiness? : no    Reviewed and updated as needed this visit by clinical staff   Tobacco  Allergies  Meds              Reviewed and updated as needed this visit by Provider                 Social History     Tobacco Use    Smoking status: Former     Packs/day: 1.00     Years: 10.00     Additional pack years: 0.00     Total pack years: 10.00     Types: Cigarettes     Quit date: 1982     Years since quittin.9    Smokeless tobacco: Never    Tobacco comments:     quit 30+ years ago   Substance Use Topics    Alcohol use: Yes     Comment: social             10/11/2023     9:31 AM   Alcohol Use   Prescreen: >3 drinks/day or >7 drinks/week? No          No data to display              Do you have a current opioid prescription? No  Do you use any other controlled substances or medications that are not prescribed by a provider? None    Joint Pain  Onset: several years   Description:   Location: right knee  Character: Sharp and Dull ache  Intensity: mild, moderate  Progression of Symptoms: worse, intermittent  Accompanying Signs & Symptoms:  Other symptoms: weakness of right leg  History:   Previous similar pain: YES    Precipitating factors:   Trauma or overuse: YES  Alleviating factors:  Improved by: rest/inactivity  Therapies Tried and outcome: steroid inj '19 helped    Hyperlipidemia Follow-Up    Are you regularly taking any medication or supplement to lower your cholesterol?   Yes- atorvastatin  Are you having muscle aches or other side effects that you think could be caused by your cholesterol lowering medication?  No    Hypertension Follow-up    Do you check your blood pressure regularly outside of the clinic? Yes   Are you following a low salt diet? Yes  Are your blood pressures ever more than 140 on the top number (systolic) OR more   than 90 on the bottom number (diastolic), for example 140/90? No    Current  providers sharing in care for this patient include:  Patient Care Team:  Raul Hobson MD as PCP - General (Family Practice)  IRINA Mendoza MD as MD (Cardiology)  Nathaniel Epperson MD as MD (Orthopedics)  Raul Hobson MD as Assigned PCP    The following health maintenance items are reviewed in Epic and correct as of today:  Health Maintenance   Topic Date Due    HEPATITIS C SCREENING  Never done    LUNG CANCER SCREENING  Never done    RSV VACCINE 60+ (1 - 1-dose 60+ series) Never done    DEXA  11/18/2018    INFLUENZA VACCINE (1) 09/01/2023    ANNUAL REVIEW OF HM ORDERS  10/07/2023    MEDICARE ANNUAL WELLNESS VISIT  10/07/2023    FALL RISK ASSESSMENT  10/11/2024    LIPID  10/07/2027    ADVANCE CARE PLANNING  10/08/2027    DTAP/TDAP/TD IMMUNIZATION (4 - Td or Tdap) 01/08/2030    PHQ-2 (once per calendar year)  Completed    Pneumococcal Vaccine: 65+ Years  Completed    ZOSTER IMMUNIZATION  Completed    COVID-19 Vaccine  Completed    IPV IMMUNIZATION  Aged Out    HPV IMMUNIZATION  Aged Out    MENINGITIS IMMUNIZATION  Aged Out    COLORECTAL CANCER SCREENING  Discontinued     Lab work is in process  Labs reviewed in EPIC    Mammogram Screening - Patient over age 75, has elected to continue with screening.  Pertinent mammograms are reviewed under the imaging tab.    Review of Systems   Constitutional:  Negative for chills and fever.   HENT:  Negative for congestion, ear pain, hearing loss and sore throat.    Eyes:  Negative for pain and visual disturbance.   Respiratory:  Negative for cough and shortness of breath.    Cardiovascular:  Negative for chest pain, palpitations and peripheral edema.   Gastrointestinal:  Negative for abdominal pain, constipation, diarrhea, heartburn, hematochezia and nausea.   Breasts:  Negative for tenderness, breast mass and discharge.   Genitourinary:  Negative for dysuria, frequency, genital sores, hematuria, pelvic pain, urgency, vaginal bleeding and vaginal  "discharge.   Musculoskeletal:  Negative for arthralgias, joint swelling and myalgias.   Skin:  Negative for rash.   Neurological:  Negative for dizziness, weakness, headaches and paresthesias.   Psychiatric/Behavioral:  Negative for mood changes. The patient is not nervous/anxious.      OBJECTIVE:   BP (!) 170/73 (BP Location: Left arm, Patient Position: Sitting, Cuff Size: Adult Small)   Pulse 74   Temp 98.2  F (36.8  C) (Temporal)   Resp 15   Ht 1.632 m (5' 4.25\")   Wt 67.8 kg (149 lb 8 oz)   LMP  (LMP Unknown)   SpO2 97%   BMI 25.46 kg/m   Estimated body mass index is 25.46 kg/m  as calculated from the following:    Height as of this encounter: 1.632 m (5' 4.25\").    Weight as of this encounter: 67.8 kg (149 lb 8 oz).  Physical Exam  GENERAL APPEARANCE: healthy, alert and no distress  EYES: Eyes grossly normal to inspection, PERRL and conjunctivae and sclerae normal  HENT: ear canals and TM's normal, nose and mouth without ulcers or lesions, oropharynx clear and oral mucous membranes moist  NECK: no adenopathy, no asymmetry, masses, or scars and thyroid normal to palpation  RESP: lungs clear to auscultation - no rales, rhonchi or wheezes  CV: regular rate and rhythm, normal S1 S2, no S3 or S4, no murmur, click or rub, no peripheral edema and peripheral pulses strong  ABDOMEN: soft, nontender, no hepatosplenomegaly, no masses and bowel sounds normal  MS: no musculoskeletal defects are noted and gait is age appropriate without ataxia  SKIN: no suspicious lesions or rashes  NEURO: Normal strength and tone, sensory exam grossly normal, mentation intact and speech normal  PSYCH: mentation appears normal and affect normal/bright    Diagnostic Test Results:  Labs reviewed in Epic    ASSESSMENT / PLAN:       ICD-10-CM    1. Encounter for preventive care  Z00.00       2. White coat syndrome with diagnosis of hypertension  I10 losartan (COZAAR) 100 MG tablet     labetalol (NORMODYNE) 200 MG tablet     atorvastatin " "(LIPITOR) 10 MG tablet     Comprehensive metabolic panel (BMP + Alb, Alk Phos, ALT, AST, Total. Bili, TP)     Comprehensive metabolic panel (BMP + Alb, Alk Phos, ALT, AST, Total. Bili, TP)      3. Hypercholesteremia  E78.00 atorvastatin (LIPITOR) 10 MG tablet     Lipid panel reflex to direct LDL Fasting     Lipid panel reflex to direct LDL Fasting      4. Osteoarthritis of right knee, unspecified osteoarthritis type  M17.11       5. Need for vaccination  Z23 INFLUENZA VACCINE 65+ (FLUZONE HD)        COUNSELING:  Reviewed preventive health counseling, as reflected in patient instructions       Regular exercise       Healthy diet/nutrition       Vision screening       Fall risk prevention      BMI:   Estimated body mass index is 25.46 kg/m  as calculated from the following:    Height as of this encounter: 1.632 m (5' 4.25\").    Weight as of this encounter: 67.8 kg (149 lb 8 oz).       She reports that she quit smoking about 40 years ago. Her smoking use included cigarettes. She has a 10.00 pack-year smoking history. She has never used smokeless tobacco.      Appropriate preventive services were discussed with this patient, including applicable screening as appropriate for fall prevention, nutrition, physical activity, Tobacco-use cessation, weight loss and cognition.  Checklist reviewing preventive services available has been given to the patient.    Reviewed patients plan of care and provided an AVS. The Basic Care Plan (routine screening as documented in Health Maintenance) for Leah meets the Care Plan requirement. This Care Plan has been established and reviewed with the Patient.    Patient Instructions   Continue current medications    Contact if podiatry, ortho referral needed    Raul Hobson MD  [facility]    Identified Health Risks:  I have reviewed Opioid Use Disorder and Substance Use Disorder risk factors and made any needed referrals.   "

## 2023-10-28 NOTE — RESULT ENCOUNTER NOTE
Nathanael Lynn: Your recent results-  Blood chemistries look good with glucose just barely into the prediabetic range.  Cholesterol panel is normal  Continue diet exercise and medication, recheck in 1 year.  Contact if questions, Nice to see you!    Raul AREVALO

## 2024-04-15 ENCOUNTER — OFFICE VISIT (OUTPATIENT)
Dept: FAMILY MEDICINE | Facility: CLINIC | Age: 79
End: 2024-04-15
Payer: COMMERCIAL

## 2024-04-15 VITALS
BODY MASS INDEX: 25.03 KG/M2 | OXYGEN SATURATION: 97 % | WEIGHT: 147 LBS | RESPIRATION RATE: 18 BRPM | SYSTOLIC BLOOD PRESSURE: 121 MMHG | TEMPERATURE: 97.6 F | HEART RATE: 101 BPM | DIASTOLIC BLOOD PRESSURE: 73 MMHG

## 2024-04-15 DIAGNOSIS — I10 HYPERTENSION GOAL BP (BLOOD PRESSURE) < 140/90: Primary | ICD-10-CM

## 2024-04-15 NOTE — NURSING NOTE
78 year old  Chief Complaint   Patient presents with    Hypertension    Establish Care       Blood pressure 121/73, pulse 101, temperature 97.6  F (36.4  C), temperature source Temporal, resp. rate 18, weight 54.9 kg (121 lb), SpO2 97%, not currently breastfeeding. Body mass index is 20.61 kg/m .  Patient Active Problem List   Diagnosis    Hypertension goal BP (blood pressure) < 140/90    Advanced directives, counseling/discussion    Colon polyp    Seasonal affective disorder (H24)    Hypercholesteremia    Osteoarthritis of right knee, unspecified osteoarthritis type       Wt Readings from Last 2 Encounters:   04/15/24 54.9 kg (121 lb)   10/11/23 67.8 kg (149 lb 8 oz)     BP Readings from Last 3 Encounters:   04/15/24 121/73   10/11/23 (!) 170/73   10/07/22 (!) 173/78         Current Outpatient Medications   Medication Sig Dispense Refill    amLODIPine (NORVASC) 5 MG tablet TAKE 1 TABLET BY MOUTH EVERY DAY 90 tablet 3    atorvastatin (LIPITOR) 10 MG tablet Take 1 tablet (10 mg) by mouth daily 90 tablet 3    CALCIUM 1500 MG OR TABS 1 po daily      GLUCOSAMINE-CHONDROITIN DS PO Take by mouth daily      labetalol (NORMODYNE) 200 MG tablet TAKE 1 TABLET BY MOUTH TWICE A  tablet 3    losartan (COZAAR) 100 MG tablet Take 1 tablet (100 mg) by mouth daily 90 tablet 3    MULTI-DAY VITAMINS OR TABS 1 TABLET DAILY      VITAMIN E daily       acetaminophen (TYLENOL) 325 MG tablet Take 2 tablets (650 mg) by mouth every 6 hours as needed for mild pain (Patient not taking: Reported on 4/15/2024) 100 tablet 0    aspirin 81 MG tablet Take by mouth daily (Patient not taking: Reported on 4/15/2024)       Current Facility-Administered Medications   Medication Dose Route Frequency Provider Last Rate Last Admin    lidocaine 1 % injection 5 mL  5 mL   Nathaniel Epperson MD   5 mL at 08/30/18 1849    triamcinolone acetonide (KENALOG-40) injection 40 mg  40 mg   Nathaniel Epperson MD   40 mg at 08/30/18 1849       Social History  "    Tobacco Use    Smoking status: Former     Current packs/day: 0.00     Average packs/day: 1 pack/day for 10.0 years (10.0 ttl pk-yrs)     Types: Cigarettes     Start date: 1972     Quit date: 1982     Years since quittin.4    Smokeless tobacco: Never    Tobacco comments:     quit 30+ years ago   Vaping Use    Vaping status: Never Used   Substance Use Topics    Alcohol use: Yes     Comment: social    Drug use: No       Health Maintenance Due   Topic Date Due    RSV VACCINE (Pregnancy & 60+) (1 - 1-dose 60+ series) Never done    DEXA  2018       No results found for: \"PAP\"      April 15, 2024 2:46 PM    "

## 2024-04-15 NOTE — PROGRESS NOTES
ASSESSMENT and PLAN:     Hypertension  Continue on current medicines with the following change, *Take the Amlodipine at night rather than in the morning.   Eat fresh veggies  Do an experiment for 10-14 days of minimal to no alcohol and see if your numbers decrease.   Obtain a smart phone ansley to record your readings.  Hypercholesterolemia  On Lipitor with Excellent numbers in Oct. 2023.     Follow-up - For annual exam in Oct. 2024 or sooner if needed for blood pressure or other needs.     Grant Elder MD  Family Medicine and Sports Medicine  [facility]      Medical assistant intake:  Leah Peña is a 78 year old female who presents to [facility] today for Hypertension and Establish Care      SUBJECTIVE:   This is my first time meeting Leah. She's a very healthy 79 yo who's only major problem has been hypertension. On Amlodipine at 5 mg/day. Also on Lorsartan at 100 mg/day and Labetalol 200 twice/day.   BPs at home: 126/80 and 135/87.     She's feeling well.   Has previously been followed by Raul Hobson MD, but he has apparently retired.       Review Of Systems:    Has been in usual state of health, e.g.   Cardiovascular: negative  Respiratory: No shortness of breath, dyspnea on exertion, cough, or hemoptysis  Gastrointestinal: negative  Genitourinary: negative    Problem list per EMR:  Patient Active Problem List   Diagnosis    Hypertension goal BP (blood pressure) < 140/90    Advanced directives, counseling/discussion    Colon polyp    Seasonal affective disorder (H24)    Hypercholesteremia    Osteoarthritis of right knee, unspecified osteoarthritis type       Current Outpatient Medications   Medication Sig Dispense Refill    amLODIPine (NORVASC) 5 MG tablet TAKE 1 TABLET BY MOUTH EVERY DAY 90 tablet 3    atorvastatin (LIPITOR) 10 MG tablet Take 1 tablet (10 mg) by mouth daily 90 tablet 3    CALCIUM 1500 MG OR TABS 1 po daily      GLUCOSAMINE-CHONDROITIN DS PO Take by mouth daily       labetalol (NORMODYNE) 200 MG tablet TAKE 1 TABLET BY MOUTH TWICE A  tablet 3    losartan (COZAAR) 100 MG tablet Take 1 tablet (100 mg) by mouth daily 90 tablet 3    MULTI-DAY VITAMINS OR TABS 1 TABLET DAILY      VITAMIN E daily       acetaminophen (TYLENOL) 325 MG tablet Take 2 tablets (650 mg) by mouth every 6 hours as needed for mild pain (Patient not taking: Reported on 4/15/2024) 100 tablet 0    aspirin 81 MG tablet Take by mouth daily (Patient not taking: Reported on 4/15/2024)         Allergies   Allergen Reactions    Hctz Dermatitis    Vasotec Cough        Social:   Social History     Social History Narrative    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     For enjoyment, loves to travel (they travel on trains as  doesn't like to fly), walk with hiking poles, and being outdoors.          OBJECTIVE    Vitals: /73 (BP Location: Left arm, Patient Position: Sitting, Cuff Size: Adult Regular)   Pulse 101   Temp 97.6  F (36.4  C) (Temporal)   Resp 18   Wt 66.7 kg (147 lb)   LMP  (LMP Unknown)   SpO2 97%   BMI 25.03 kg/m    BMI= Body mass index is 25.03 kg/m .    Appears well and in no distress.  She walks to our clinic.  She has her normal walking poles.   normal ambulation.    Neck is supple without lymphadenopathy    Cardiovascular-regular rate and rhythm without murmurs    Lungs are clear to auscultation throughout    Extremities have trace edema near the socks.  Nothing very severe.  She is comfortable with this amount of swelling which could be because by the amlodipine as we discussed.    SEE TOP OF NOTE FOR ASSESSMENT AND PLAN    --Grant Elder MD  [facility], Department of Family Medicine and Community Health

## 2024-04-15 NOTE — PATIENT INSTRUCTIONS
ASSESSMENT and PLAN:     Hypertension  Continue on current medicines with the following change, *Take the Amlodipine at night rather than in the morning.   Eat fresh veggies  Do an experiment for 10-14 days of minimal to no alcohol and see if your numbers decrease.   Obtain a smart phone ansley to record your readings.  Hypercholesterolemia  On Lipitor with Excellent numbers in Oct. 2023.     Follow-up - For annual exam in Oct. 2024 or sooner if needed for blood pressure or other needs.     Grant Elder MD  Family Medicine and Sports Medicine  [facility]

## 2024-08-03 ENCOUNTER — HEALTH MAINTENANCE LETTER (OUTPATIENT)
Age: 79
End: 2024-08-03

## 2024-08-19 DIAGNOSIS — I10 ESSENTIAL (PRIMARY) HYPERTENSION: ICD-10-CM

## 2024-08-19 RX ORDER — AMLODIPINE BESYLATE 5 MG/1
TABLET ORAL
Qty: 90 TABLET | Refills: 0 | Status: SHIPPED | OUTPATIENT
Start: 2024-08-19

## 2024-09-25 DIAGNOSIS — I10 WHITE COAT SYNDROME WITH DIAGNOSIS OF HYPERTENSION: ICD-10-CM

## 2024-09-25 DIAGNOSIS — E78.00 HYPERCHOLESTEREMIA: ICD-10-CM

## 2024-09-25 RX ORDER — ATORVASTATIN CALCIUM 10 MG/1
10 TABLET, FILM COATED ORAL DAILY
Qty: 90 TABLET | Refills: 0 | Status: SHIPPED | OUTPATIENT
Start: 2024-09-25

## 2024-10-14 ENCOUNTER — OFFICE VISIT (OUTPATIENT)
Dept: FAMILY MEDICINE | Facility: CLINIC | Age: 79
End: 2024-10-14
Payer: COMMERCIAL

## 2024-10-14 VITALS
HEART RATE: 71 BPM | OXYGEN SATURATION: 98 % | DIASTOLIC BLOOD PRESSURE: 80 MMHG | TEMPERATURE: 97.1 F | BODY MASS INDEX: 24.41 KG/M2 | SYSTOLIC BLOOD PRESSURE: 126 MMHG | RESPIRATION RATE: 18 BRPM | WEIGHT: 143 LBS | HEIGHT: 64 IN

## 2024-10-14 DIAGNOSIS — Z13.820 SCREENING FOR OSTEOPOROSIS: ICD-10-CM

## 2024-10-14 DIAGNOSIS — R41.3 MEMORY CHANGES: ICD-10-CM

## 2024-10-14 DIAGNOSIS — D64.9 ANEMIA, UNSPECIFIED TYPE: ICD-10-CM

## 2024-10-14 DIAGNOSIS — Z13.1 SCREENING FOR DIABETES MELLITUS: ICD-10-CM

## 2024-10-14 DIAGNOSIS — E78.00 HYPERCHOLESTEREMIA: ICD-10-CM

## 2024-10-14 DIAGNOSIS — R53.83 OTHER FATIGUE: ICD-10-CM

## 2024-10-14 DIAGNOSIS — Z00.00 ANNUAL PHYSICAL EXAM: Primary | ICD-10-CM

## 2024-10-14 DIAGNOSIS — I10 HYPERTENSION GOAL BP (BLOOD PRESSURE) < 140/90: ICD-10-CM

## 2024-10-14 LAB
ANION GAP SERPL CALCULATED.3IONS-SCNC: 10 MMOL/L (ref 7–15)
BUN SERPL-MCNC: 9.3 MG/DL (ref 8–23)
CALCIUM SERPL-MCNC: 9.2 MG/DL (ref 8.8–10.4)
CHLORIDE SERPL-SCNC: 95 MMOL/L (ref 98–107)
CHOLEST SERPL-MCNC: 174 MG/DL
CREAT SERPL-MCNC: 0.65 MG/DL (ref 0.51–0.95)
EGFRCR SERPLBLD CKD-EPI 2021: 89 ML/MIN/1.73M2
ERYTHROCYTE [DISTWIDTH] IN BLOOD BY AUTOMATED COUNT: 12.1 % (ref 10–15)
EST. AVERAGE GLUCOSE BLD GHB EST-MCNC: 103 MG/DL
FASTING STATUS PATIENT QL REPORTED: NO
FASTING STATUS PATIENT QL REPORTED: NO
GLUCOSE SERPL-MCNC: 95 MG/DL (ref 70–99)
HBA1C MFR BLD: 5.2 % (ref 0–5.6)
HCO3 SERPL-SCNC: 22 MMOL/L (ref 22–29)
HCT VFR BLD AUTO: 36 % (ref 35–47)
HDLC SERPL-MCNC: 87 MG/DL
HGB BLD-MCNC: 12.3 G/DL (ref 11.7–15.7)
LDLC SERPL CALC-MCNC: 72 MG/DL
MCH RBC QN AUTO: 32.7 PG (ref 26.5–33)
MCHC RBC AUTO-ENTMCNC: 34.2 G/DL (ref 31.5–36.5)
MCV RBC AUTO: 96 FL (ref 78–100)
NONHDLC SERPL-MCNC: 87 MG/DL
PLATELET # BLD AUTO: 299 10E3/UL (ref 150–450)
POTASSIUM SERPL-SCNC: 4.4 MMOL/L (ref 3.4–5.3)
RBC # BLD AUTO: 3.76 10E6/UL (ref 3.8–5.2)
SODIUM SERPL-SCNC: 127 MMOL/L (ref 135–145)
TRIGL SERPL-MCNC: 73 MG/DL
TSH SERPL DL<=0.005 MIU/L-ACNC: 2.48 UIU/ML (ref 0.3–4.2)
WBC # BLD AUTO: 6.6 10E3/UL (ref 4–11)

## 2024-10-14 SDOH — HEALTH STABILITY: PHYSICAL HEALTH: ON AVERAGE, HOW MANY DAYS PER WEEK DO YOU ENGAGE IN MODERATE TO STRENUOUS EXERCISE (LIKE A BRISK WALK)?: 4 DAYS

## 2024-10-14 ASSESSMENT — SOCIAL DETERMINANTS OF HEALTH (SDOH): HOW OFTEN DO YOU GET TOGETHER WITH FRIENDS OR RELATIVES?: MORE THAN THREE TIMES A WEEK

## 2024-10-14 NOTE — NURSING NOTE
"78 year old  Chief Complaint   Patient presents with    Physical     No concerns, medication refills.        Blood pressure (!) 152/80, pulse 71, temperature 97.1  F (36.2  C), temperature source Skin, resp. rate 18, height 1.632 m (5' 4.25\"), weight 64.9 kg (143 lb), SpO2 98%, not currently breastfeeding. Body mass index is 24.35 kg/m .  Patient Active Problem List   Diagnosis    Hypertension goal BP (blood pressure) < 140/90    Colon polyp    Seasonal affective disorder (H)    Hypercholesteremia    Osteoarthritis of right knee, unspecified osteoarthritis type       Wt Readings from Last 2 Encounters:   10/14/24 64.9 kg (143 lb)   04/15/24 66.7 kg (147 lb)     BP Readings from Last 3 Encounters:   10/14/24 (!) 152/80   04/15/24 121/73   10/11/23 (!) 170/73         Current Outpatient Medications   Medication Sig Dispense Refill    amLODIPine (NORVASC) 5 MG tablet TAKE 1 TABLET BY MOUTH EVERY DAY 90 tablet 0    atorvastatin (LIPITOR) 10 MG tablet TAKE 1 TABLET BY MOUTH EVERY DAY 90 tablet 0    CALCIUM 1500 MG OR TABS 1 po daily      GLUCOSAMINE-CHONDROITIN DS PO Take by mouth daily      labetalol (NORMODYNE) 200 MG tablet TAKE 1 TABLET BY MOUTH TWICE A  tablet 3    losartan (COZAAR) 100 MG tablet Take 1 tablet (100 mg) by mouth daily 90 tablet 3    MULTI-DAY VITAMINS OR TABS 1 TABLET DAILY      VITAMIN E daily       acetaminophen (TYLENOL) 325 MG tablet Take 2 tablets (650 mg) by mouth every 6 hours as needed for mild pain (Patient not taking: Reported on 4/15/2024) 100 tablet 0    aspirin 81 MG tablet Take by mouth daily (Patient not taking: Reported on 4/15/2024)       Current Facility-Administered Medications   Medication Dose Route Frequency Provider Last Rate Last Admin    lidocaine 1 % injection 5 mL  5 mL   Nathaniel Epperson MD   5 mL at 08/30/18 1849    triamcinolone acetonide (KENALOG-40) injection 40 mg  40 mg   Nathaniel Epperson MD   40 mg at 08/30/18 1849       Social History     Tobacco Use    " "Smoking status: Former     Current packs/day: 0.00     Average packs/day: 1 pack/day for 10.0 years (10.0 ttl pk-yrs)     Types: Cigarettes     Start date: 1972     Quit date: 1982     Years since quittin.9    Smokeless tobacco: Never    Tobacco comments:     quit 30+ years ago   Vaping Use    Vaping status: Never Used   Substance Use Topics    Alcohol use: Yes     Comment: social    Drug use: No       Health Maintenance Due   Topic Date Due    RSV VACCINE (1 - 1-dose 75+ series) Never done    DEXA  2018    BMP  10/11/2024    LIPID  10/11/2024    MEDICARE ANNUAL WELLNESS VISIT  10/11/2024       No results found for: \"PAP\"      2024 1:02 PM   "

## 2024-10-14 NOTE — PROGRESS NOTES
Preventive Care Visit  [facility]  Grant Elder MD, Family Medicine    IMPRESSION  Leah is an 77 yo with hypertension, on 3 meds, hyperlipidemia on Lipitor. Generally doing well. Reports decline in memory    ASSESSMENT/PLAN:    Annual Exam/Preventive Issues   - Labs: Check lipids, BMP and A1C. Also, a CBC given past anemia  - Immunizations: Up to date  - Cancer screenings: Reports normal colonoscopies and mammograms in the past and has aged out of further testing  - Also, sent for a DEXA scan. Can call [phone] to schedule imaging tests at the [facility].       -Specific concerns:     Declining memory   -Referred for evaluation and treatment at Neurology      2. Fatigue  -Will check CBC. Last was done in 2014 and with anemia.   Also, check TSH      -Follow up: as needed pending blood work.     Grant Elder MD  Family Medicine and Sports Medicine      INTRODUCTION:   This is my 2nd time meeting Leah. She previously was followed by Raul Hobson MD who is now retired. This is Leah's first preventive visit with us at [facility]. She's here with her , Juan Manuel.     Checks BP at home and numbers are around 130/80 on average.     Her main concern is declining memory over the past year. She quickly forgets plans.     Also, her  reports that Leah has fatigue, but she denies it.     No falls.     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     For enjoyment, loves to travel (they travel on trains as  doesn't like to fly), walk with hiking poles, and being outdoors.            Health Care Directive  Patient has a Health Care Directive on file  Advance care planning  document is on file and is current.        10/14/2024   General Health   How would you rate your overall physical health? Good   Feel stress (tense, anxious, or unable to sleep) Not at all            10/14/2024   Nutrition   Diet: Regular (no restrictions)            10/14/2024   Exercise   Days per week of moderate/strenous exercise 4 days            10/14/2024   Social Factors   Frequency of gathering with friends or relatives More than three times a week   Worry food won't last until get money to buy more No   Food not last or not have enough money for food? No   Do you have housing? (Housing is defined as stable permanent housing and does not include staying ouside in a car, in a tent, in an abandoned building, in an overnight shelter, or couch-surfing.) Yes   Are you worried about losing your housing? No   Lack of transportation? No   Unable to get utilities (heat,electricity)? No            10/14/2024   Fall Risk   Fallen 2 or more times in the past year? No    No   Trouble with walking or balance? No    No       Multiple values from one day are sorted in reverse-chronological order          10/14/2024   Activities of Daily Living- Home Safety   Needs help with the following daily activites None of the above   Safety concerns in the home None of the above            10/14/2024   Dental   Dentist two times every year? Yes            10/14/2024   Hearing Screening   Hearing concerns? None of the above            10/14/2024   Driving Risk Screening   Patient/family members have concerns about driving No            10/14/2024   General Alertness/Fatigue Screening   Have you been more tired than usual lately? No    Her  believes otherwise.         10/14/2024   Urinary Incontinence Screening   Bothered by leaking urine in past 6 months No            10/14/2024   TB Screening   Were you born outside of the [place]? No              Today's PHQ-2 Score:       4/15/2024     2:40 PM   PHQ-2 ( 1999 [employer])   Q1: Little  interest or pleasure in doing things 0   Q2: Feeling down, depressed or hopeless 0   PHQ-2 Score 0         10/14/2024   Substance Use   Alcohol more than 3/day or more than 7/wk No   Do you have a current opioid prescription? No   How severe/bad is pain from 1 to 10? 0/10 (No Pain)   Do you use any other substances recreationally? No        Social History     Tobacco Use    Smoking status: Former     Current packs/day: 0.00     Average packs/day: 1 pack/day for 10.0 years (10.0 ttl pk-yrs)     Types: Cigarettes     Start date: 1972     Quit date: 1982     Years since quittin.9    Smokeless tobacco: Never    Tobacco comments:     quit 30+ years ago   Vaping Use    Vaping status: Never Used   Substance Use Topics    Alcohol use: Yes     Comment: social    Drug use: No       Reviewed and updated as needed this visit by Provider   Tobacco  Allergies  Meds  Problems  Med Hx  Surg Hx  Fam Hx            Current providers sharing in care for this patient include:  Patient Care Team:  Grant Elder MD as PCP - General (Family Medicine)  IRINA Mendoza MD as MD (Cardiology)  Nathaniel Epperson MD as MD (Orthopedics)  Raul Hobson MD as Assigned PCP    The following health maintenance items are reviewed in Epic and correct as of today:  Health Maintenance   Topic Date Due    RSV VACCINE (1 - 1-dose 75+ series) Never done    DEXA  2018    BMP  10/11/2024    LIPID  10/11/2024    MEDICARE ANNUAL WELLNESS VISIT  10/11/2024    FALL RISK ASSESSMENT  10/14/2025    GLUCOSE  10/11/2026    ADVANCE CARE PLANNING  10/14/2029    DTAP/TDAP/TD IMMUNIZATION (4 - Td or Tdap) 2030    PHQ-2 (once per calendar year)  Completed    INFLUENZA VACCINE  Completed    Pneumococcal Vaccine: 65+ Years  Completed    ZOSTER IMMUNIZATION  Completed    COVID-19 Vaccine  Completed    HPV IMMUNIZATION  Aged Out    MENINGITIS IMMUNIZATION  Aged Out    RSV MONOCLONAL ANTIBODY  Aged Out    COLORECTAL  "CANCER SCREENING  Discontinued          Objective    Exam  BP (!) 152/80 (BP Location: Left arm, Patient Position: Sitting, Cuff Size: Adult Regular)   Pulse 71   Temp 97.1  F (36.2  C) (Skin)   Resp 18   Ht 1.632 m (5' 4.25\")   Wt 64.9 kg (143 lb)   LMP  (LMP Unknown)   SpO2 98%   BMI 24.35 kg/m     Estimated body mass index is 24.35 kg/m  as calculated from the following:    Height as of this encounter: 1.632 m (5' 4.25\").    Weight as of this encounter: 64.9 kg (143 lb).    Physical Exam  GENERAL: alert and no distress  EYES: Eyes grossly normal to inspection  HENT: ear canals and TM's normal, nose and mouth without ulcers or lesions  NECK: no adenopathy, no asymmetry, masses, or scars  RESP: lungs clear to auscultation - no rales, rhonchi or wheezes  CV: regular rate and rhythm, normal S1 S2, no S3 or S4, no murmur, click or rub, no peripheral edema  ABDOMEN: soft, nontender, no hepatosplenomegaly, no masses and bowel sounds normal  MS: no gross musculoskeletal defects noted, no edema  SKIN: no suspicious lesions or rashes  NEURO: Normal strength and tone, mentation intact and speech normal  PSYCH: mentation appears normal, affect normal/bright        10/14/2024   Mini Cog   Clock Draw Score 2 Normal   3 Item Recall 0 objects recalled   Mini Cog Total Score 2             Knows date but has trouble with some other current issues.     Signed Electronically by: Grant Elder MD    "

## 2024-10-14 NOTE — PATIENT INSTRUCTIONS
MERRITT Lynn is an 79 yo with hypertension, on 3 meds, hyperlipidemia on Lipitor. Generally doing well. Reports decline in memory    ASSESSMENT/PLAN:    Annual Exam/Preventive Issues   - Labs: Check lipids, BMP and A1C. Also, a CBC given past anemia  - Immunizations: Up to date  - Cancer screenings: Reports normal colonoscopies and mammograms in the past and has aged out of further testing    -Specific concerns:     Declining memory   -Referred for evaluation and treatment at Neurology      2. Fatigue  -Will check CBC. Last was done in 2014 and with anemia.   Also, check TSH      -Follow up: as needed pending blood work.     Grant Elder MD  Family Medicine and Sports Medicine

## 2024-10-15 ENCOUNTER — PATIENT OUTREACH (OUTPATIENT)
Dept: CARE COORDINATION | Facility: CLINIC | Age: 79
End: 2024-10-15
Payer: COMMERCIAL

## 2024-10-17 ENCOUNTER — PATIENT OUTREACH (OUTPATIENT)
Dept: CARE COORDINATION | Facility: CLINIC | Age: 79
End: 2024-10-17
Payer: COMMERCIAL

## 2024-11-06 DIAGNOSIS — I10 WHITE COAT SYNDROME WITH DIAGNOSIS OF HYPERTENSION: ICD-10-CM

## 2024-11-07 RX ORDER — LOSARTAN POTASSIUM 100 MG/1
100 TABLET ORAL DAILY
Qty: 90 TABLET | Refills: 2 | Status: SHIPPED | OUTPATIENT
Start: 2024-11-07

## 2024-11-15 DIAGNOSIS — I10 ESSENTIAL (PRIMARY) HYPERTENSION: ICD-10-CM

## 2024-11-18 RX ORDER — AMLODIPINE BESYLATE 5 MG/1
TABLET ORAL
Qty: 90 TABLET | Refills: 0 | Status: SHIPPED | OUTPATIENT
Start: 2024-11-18

## 2024-11-26 DIAGNOSIS — I10 WHITE COAT SYNDROME WITH DIAGNOSIS OF HYPERTENSION: ICD-10-CM

## 2024-11-26 RX ORDER — LABETALOL 200 MG/1
TABLET, FILM COATED ORAL
Qty: 180 TABLET | Refills: 3 | Status: SHIPPED | OUTPATIENT
Start: 2024-11-26

## 2025-02-12 DIAGNOSIS — I10 ESSENTIAL (PRIMARY) HYPERTENSION: ICD-10-CM

## 2025-02-12 RX ORDER — AMLODIPINE BESYLATE 5 MG/1
TABLET ORAL
Qty: 90 TABLET | Refills: 0 | Status: SHIPPED | OUTPATIENT
Start: 2025-02-12

## 2025-02-12 NOTE — TELEPHONE ENCOUNTER
Amlodipine (Norvasc) 5 mg    Last Office Visit: 10/14/24  [facility] Appointments: None  Medication last refilled: 11/18/24 #90 with 0 refill(s)    Vital Signs Systolic Diastolic   10/14/24 126 80   4/15/24 121 73   10/17/23 170 73     Required labs per protocol:    LAB REF RANGE 10/11/23 10/14/24   GFR >60 mL/min/1.73m2  82 89   Creatinine 0.67-1.17 mg/dL 0.74 0.65   Potassium 3.4-5.3 mmol/L 4.4 4.4     Prescription approved per [facility] Refill Protocol.    Suzanne Haynes, BSN, RN, CCM

## 2025-05-11 DIAGNOSIS — I10 ESSENTIAL (PRIMARY) HYPERTENSION: ICD-10-CM

## 2025-05-12 RX ORDER — AMLODIPINE BESYLATE 5 MG/1
5 TABLET ORAL
Qty: 30 TABLET | Refills: 0 | Status: SHIPPED | OUTPATIENT
Start: 2025-05-12

## 2025-05-12 NOTE — TELEPHONE ENCOUNTER
Medication requested: amLODIPine (NORVASC) 5 MG tablet   Last office visit: 10/14/24  [facility] appointments: none  Medication last refilled: 2/12/25; 90 + 0 refills  Last qualifying labs:   BP Readings from Last 3 Encounters:   10/14/24 126/80   04/15/24 121/73   10/11/23 (!) 170/73     Medication is being filled for 1 time refill only due to:  Patient needs labs repeat BMP to follow-up on hyponatremia. Patient needs to be seen because due for BP follow-up.    Mac LONGORIA, RN  05/12/25 12:40 PM

## 2025-06-07 DIAGNOSIS — I10 ESSENTIAL (PRIMARY) HYPERTENSION: ICD-10-CM

## 2025-06-09 RX ORDER — AMLODIPINE BESYLATE 5 MG/1
5 TABLET ORAL
Qty: 30 TABLET | Refills: 0 | Status: SHIPPED | OUTPATIENT
Start: 2025-06-09

## 2025-06-09 NOTE — TELEPHONE ENCOUNTER
Medication requested: amLODIPine (NORVASC) 5 MG tablet   Last office visit: 10/14/2024  [facility] appointments: none  Medication last refilled: 5/12/2025; #30 + 0 refills  Last qualifying labs:     Component      Latest Ref Rng 10/14/2024  1:41 PM   GFR Estimate      >60 mL/min/1.73m2 89      Medication is being filled for 1 time refill only due to:  Patient needs to be seen because due for BP follow-up.    SWATI Al, RN  06/09/25, 10:11 AM      
Obtain Level of Care/Service Not Available at this Facility

## 2025-07-06 DIAGNOSIS — I10 ESSENTIAL (PRIMARY) HYPERTENSION: ICD-10-CM

## 2025-07-07 ENCOUNTER — OFFICE VISIT (OUTPATIENT)
Dept: FAMILY MEDICINE | Facility: CLINIC | Age: 80
End: 2025-07-07
Payer: COMMERCIAL

## 2025-07-07 VITALS
OXYGEN SATURATION: 97 % | RESPIRATION RATE: 15 BRPM | SYSTOLIC BLOOD PRESSURE: 134 MMHG | TEMPERATURE: 97.9 F | HEART RATE: 74 BPM | DIASTOLIC BLOOD PRESSURE: 72 MMHG

## 2025-07-07 DIAGNOSIS — I10 HYPERTENSION GOAL BP (BLOOD PRESSURE) < 140/90: Primary | ICD-10-CM

## 2025-07-07 NOTE — PROGRESS NOTES
ASSESSMENT and PLAN:     Hypertension, Well controlled on current medications.   -   No change in medications.  She is not in need of any refills at the current time.  -  Encouraged her to continue to check her blood pressure every few days.  Let us know if there are any dramatic changes.    Follow-up in a few months from now    Grant Elder MD  Family Medicine and Sports Medicine  [facility]      Medical assistant intake:  Leah Peña is a 79 year old female who presents to [facility] today for Blood Pressure Check      SUBJECTIVE:   Leah granados is an 79-year-old who I have met on 1 previous occasion, that was about 8 months ago.  She has been on 3 medications for her blood pressure for years.  She takes her blood pressure intermittently at home and occasionally gets a number that is elevated such as a systolic at about 150.  She also brings in a few readings with systolic of 95 and systolic of 106.  Earlier today was 144/83 at home.  She is here to have a blood pressure check and reassess the need for her medications.  She is otherwise feeling well    Review Of Systems:     she is feeling well.  No chest pain or shortness of breath.  No swelling in the legs.    Problem list per EMR:  Patient Active Problem List   Diagnosis    Hypertension goal BP (blood pressure) < 140/90    Colon polyp    Seasonal affective disorder    Hypercholesteremia    Osteoarthritis of right knee, unspecified osteoarthritis type    Memory changes       Current Outpatient Medications   Medication Sig Dispense Refill    amLODIPine (NORVASC) 5 MG tablet TAKE 1 TABLET (5 MG) BY MOUTH DAILY AT 2 PM. SCHEDULE APPT WITH PCP FOR REFILLS 30 tablet 0    atorvastatin (LIPITOR) 10 MG tablet TAKE 1 TABLET BY MOUTH EVERY DAY 90 tablet 1    CALCIUM 1500 MG OR TABS 1 po daily      GLUCOSAMINE-CHONDROITIN DS PO Take by mouth daily      labetalol (NORMODYNE) 200 MG tablet TAKE 1 TABLET BY MOUTH TWICE A  tablet 3    losartan (COZAAR)  100 MG tablet Take 1 tablet (100 mg) by mouth daily. 90 tablet 2    MULTI-DAY VITAMINS OR TABS 1 TABLET DAILY      VITAMIN E daily          Allergies   Allergen Reactions    Hctz Dermatitis    Vasotec Cough          OBJECTIVE    Vitals: /72   Pulse 74   Temp 97.9  F (36.6  C) (Skin)   Resp 15   LMP  (LMP Unknown)   SpO2 97%   BMI= There is no height or weight on file to calculate BMI.    Appears well.   initial blood pressure was elevated at 153/72 but then rechecked and was in the normal range at 134/72  Vitals:    07/07/25 1213 07/07/25 1221   BP: (!) 153/72 134/72        cardiovascular-regular rate and rhythm without murmurs.  Lungs were clear.  Extremities were without edema.    SEE TOP OF NOTE FOR ASSESSMENT AND PLAN    --Grant Elder MD  [facility], Department of Family Medicine and Community Health

## 2025-07-07 NOTE — NURSING NOTE
79 year old    Chief Complaint   Patient presents with    Blood Pressure Check        Blood pressure (!) 153/72, pulse 74, temperature 97.9  F (36.6  C), temperature source Skin, resp. rate 15, SpO2 97%, not currently breastfeeding. There is no height or weight on file to calculate BMI.    Patient Active Problem List   Diagnosis    Hypertension goal BP (blood pressure) < 140/90    Colon polyp    Seasonal affective disorder    Hypercholesteremia    Osteoarthritis of right knee, unspecified osteoarthritis type    Memory changes          Wt Readings from Last 2 Encounters:   10/14/24 64.9 kg (143 lb)   04/15/24 66.7 kg (147 lb)       BP Readings from Last 3 Encounters:   25 (!) 153/72   10/14/24 126/80   04/15/24 121/73             Current Outpatient Medications   Medication Sig Dispense Refill    amLODIPine (NORVASC) 5 MG tablet TAKE 1 TABLET (5 MG) BY MOUTH DAILY AT 2 PM. SCHEDULE APPT WITH PCP FOR REFILLS 30 tablet 0    atorvastatin (LIPITOR) 10 MG tablet TAKE 1 TABLET BY MOUTH EVERY DAY 90 tablet 1    CALCIUM 1500 MG OR TABS 1 po daily      GLUCOSAMINE-CHONDROITIN DS PO Take by mouth daily      labetalol (NORMODYNE) 200 MG tablet TAKE 1 TABLET BY MOUTH TWICE A  tablet 3    losartan (COZAAR) 100 MG tablet Take 1 tablet (100 mg) by mouth daily. 90 tablet 2    MULTI-DAY VITAMINS OR TABS 1 TABLET DAILY      VITAMIN E daily        No current facility-administered medications for this visit.          Social History     Tobacco Use    Smoking status: Former     Current packs/day: 0.00     Average packs/day: 1 pack/day for 10.0 years (10.0 ttl pk-yrs)     Types: Cigarettes     Start date: 1972     Quit date: 1982     Years since quittin.6    Smokeless tobacco: Never    Tobacco comments:     quit 30+ years ago   Vaping Use    Vaping status: Never Used   Substance Use Topics    Alcohol use: Yes     Comment: social    Drug use: No          Health Maintenance Due   Topic Date Due    RSV VACCINE (1  "- 1-dose 75+ series) Never done    DEXA  11/18/2018    PHQ-2 (once per calendar year)  01/01/2025    COVID-19 VACCINE (9 - 2024-25 season) 04/05/2025        No results found for: \"PAP\"        July 7, 2025 12:15 PM        "

## 2025-07-08 RX ORDER — AMLODIPINE BESYLATE 5 MG/1
TABLET ORAL
Qty: 90 TABLET | Refills: 0 | Status: SHIPPED | OUTPATIENT
Start: 2025-07-08

## 2025-07-08 NOTE — TELEPHONE ENCOUNTER
Amlodipine (Norvasc) 5 mg    Last Office Visit: 7/7/25  [facility] Appointments: 10/15/25  Medication last refilled: 6/9/25 #30 with 0 refill(s)    Vital Signs Systolic Diastolic   7/7/25 134 72   4/15/24 121 73   10/11/23 170 73     Required labs per protocol:    LAB REF RANGE 10/11/23 10/14/24   GFR >60 mL/min/1.73m2  82 89   Creatinine 0.67-1.17 mg/dL 0.74 0.65   Potassium 3.4-5.3 mmol/L 4.4 4.4     Prescription approved per [facility] Refill Protocol.    Suzanne Haynes, NADIRN, RN, CCM

## 2025-07-31 DIAGNOSIS — I10 WHITE COAT SYNDROME WITH DIAGNOSIS OF HYPERTENSION: ICD-10-CM

## 2025-07-31 RX ORDER — LOSARTAN POTASSIUM 100 MG/1
100 TABLET ORAL DAILY
Qty: 90 TABLET | Refills: 2 | Status: SHIPPED | OUTPATIENT
Start: 2025-07-31

## 2025-07-31 NOTE — TELEPHONE ENCOUNTER
Losartan (Cozaar) 100 mg    Last Office Visit: 7/7/25  [facility] Appointments: 10/15/25  Medication last refilled: 11/7/24 #90 with 2 refill(s)    Vital Signs Systolic Diastolic   7/7/25 134 72   10/14/24 126 80   4/15/24 121 73     Required labs per protocol:    LAB REF RANGE 10/11/23 10/14/24   GFR >60 mL/min/1.73m2  82 89   Creatinine 0.67-1.17 mg/dL 0.74 0.65   Potassium 3.4-5.3 mmol/L 4.4 4.4     Prescription approved per [facility] Refill Protocol.    Suzanne Haynes, NADIRN, RN, CCM

## (undated) RX ORDER — LIDOCAINE HYDROCHLORIDE 10 MG/ML
INJECTION, SOLUTION INFILTRATION; PERINEURAL
Status: DISPENSED
Start: 2017-02-09

## (undated) RX ORDER — LIDOCAINE HYDROCHLORIDE 10 MG/ML
INJECTION, SOLUTION INFILTRATION; PERINEURAL
Status: DISPENSED
Start: 2018-08-30

## (undated) RX ORDER — LIDOCAINE HYDROCHLORIDE 10 MG/ML
INJECTION, SOLUTION EPIDURAL; INFILTRATION; INTRACAUDAL; PERINEURAL
Status: DISPENSED
Start: 2019-01-24

## (undated) RX ORDER — LIDOCAINE HYDROCHLORIDE 10 MG/ML
INJECTION, SOLUTION INFILTRATION; PERINEURAL
Status: DISPENSED
Start: 2018-03-15

## (undated) RX ORDER — TRIAMCINOLONE ACETONIDE 40 MG/ML
INJECTION, SUSPENSION INTRA-ARTICULAR; INTRAMUSCULAR
Status: DISPENSED
Start: 2019-01-24

## (undated) RX ORDER — TRIAMCINOLONE ACETONIDE 40 MG/ML
INJECTION, SUSPENSION INTRA-ARTICULAR; INTRAMUSCULAR
Status: DISPENSED
Start: 2018-08-30

## (undated) RX ORDER — TRIAMCINOLONE ACETONIDE 40 MG/ML
INJECTION, SUSPENSION INTRA-ARTICULAR; INTRAMUSCULAR
Status: DISPENSED
Start: 2017-02-09

## (undated) RX ORDER — TRIAMCINOLONE ACETONIDE 40 MG/ML
INJECTION, SUSPENSION INTRA-ARTICULAR; INTRAMUSCULAR
Status: DISPENSED
Start: 2018-03-15